# Patient Record
Sex: MALE | Race: WHITE | Employment: FULL TIME | URBAN - NONMETROPOLITAN AREA
[De-identification: names, ages, dates, MRNs, and addresses within clinical notes are randomized per-mention and may not be internally consistent; named-entity substitution may affect disease eponyms.]

---

## 2018-07-31 ENCOUNTER — HOSPITAL ENCOUNTER (EMERGENCY)
Age: 33
Discharge: HOME | End: 2018-07-31
Attending: EMERGENCY MEDICINE
Payer: COMMERCIAL

## 2018-07-31 VITALS
BODY MASS INDEX: 20.33 KG/M2 | WEIGHT: 142 LBS | HEIGHT: 70 IN | DIASTOLIC BLOOD PRESSURE: 70 MMHG | OXYGEN SATURATION: 98 % | TEMPERATURE: 98.4 F | SYSTOLIC BLOOD PRESSURE: 128 MMHG | HEART RATE: 78 BPM | RESPIRATION RATE: 14 BRPM

## 2018-07-31 DIAGNOSIS — S83.91XA SPRAIN OF RIGHT KNEE, UNSPECIFIED LIGAMENT, INITIAL ENCOUNTER: Primary | ICD-10-CM

## 2018-07-31 DIAGNOSIS — F17.200 TOBACCO USE DISORDER: ICD-10-CM

## 2018-07-31 PROCEDURE — 99213 OFFICE O/P EST LOW 20 MIN: CPT | Performed by: EMERGENCY MEDICINE

## 2018-07-31 PROCEDURE — 2709999900 HC NON-CHARGEABLE SUPPLY

## 2018-07-31 PROCEDURE — G0463 HOSPITAL OUTPT CLINIC VISIT: HCPCS

## 2018-07-31 RX ORDER — NAPROXEN 500 MG/1
500 TABLET ORAL 2 TIMES DAILY WITH MEALS
Qty: 20 TABLET | Refills: 0 | Status: SHIPPED | OUTPATIENT
Start: 2018-07-31 | End: 2019-01-18 | Stop reason: ALTCHOICE

## 2018-07-31 ASSESSMENT — ENCOUNTER SYMPTOMS
SORE THROAT: 0
EYE REDNESS: 0
ABDOMINAL PAIN: 0
STRIDOR: 0
EYE DISCHARGE: 0
COUGH: 0
SHORTNESS OF BREATH: 0
TROUBLE SWALLOWING: 0
VOMITING: 0
NAUSEA: 0
VOICE CHANGE: 0
BACK PAIN: 0
SINUS PRESSURE: 0
WHEEZING: 0
EYE PAIN: 0
DIARRHEA: 0

## 2018-07-31 ASSESSMENT — PAIN DESCRIPTION - DESCRIPTORS: DESCRIPTORS: THROBBING

## 2018-07-31 ASSESSMENT — PAIN SCALES - GENERAL: PAINLEVEL_OUTOF10: 10

## 2018-07-31 ASSESSMENT — PAIN DESCRIPTION - ORIENTATION: ORIENTATION: RIGHT;OTHER (COMMENT)

## 2018-07-31 ASSESSMENT — PAIN DESCRIPTION - LOCATION: LOCATION: KNEE

## 2018-07-31 NOTE — ED NOTES
No change in patients condition. Discharge instructions and prescriptions discussed with pt. Pt. Verbalized understanding of info given and ambulated to exit in stable condition.       Cyndi Moreno RN  07/31/18 4560

## 2018-07-31 NOTE — ED PROVIDER NOTES
No scleral icterus. Neck: Normal range of motion. No JVD present. No thyromegaly present. No meningismus   Cardiovascular: Normal rate, regular rhythm, S1 normal, S2 normal, normal heart sounds, intact distal pulses and normal pulses. Exam reveals no gallop and no friction rub. No murmur heard. Pulmonary/Chest: Effort normal and breath sounds normal. No stridor. No tachypnea. No respiratory distress. He has no decreased breath sounds. He has no wheezes. He has no rhonchi. He has no rales. He exhibits no tenderness. No cough, lungs clear   Abdominal: Soft. Bowel sounds are normal. He exhibits no distension and no mass. There is no tenderness. There is no rebound, no guarding and no CVA tenderness. Soft nontender   Musculoskeletal: Normal range of motion. He exhibits no edema. Right knee: He exhibits normal range of motion, no swelling, no effusion, no ecchymosis, no deformity, no laceration, no erythema, normal alignment, no LCL laxity, normal patellar mobility, no bony tenderness, normal meniscus and no MCL laxity. Tenderness found. Medial joint line tenderness noted. No patellar tendon tenderness noted. Right lower leg: Normal.   Good range of motion, tenderness on the medial joint line of the right knee. No effusion, no locking up. Distal neurovascular intact. Lymphadenopathy:     He has no cervical adenopathy. Right cervical: No superficial cervical adenopathy present. Left cervical: No superficial cervical adenopathy present. Neurological: He is alert and oriented to person, place, and time. He has normal reflexes. No cranial nerve deficit. He exhibits normal muscle tone. Coordination normal.   Appropriate, no focal findings, distal neurovascular intact right lower extremity   Skin: Skin is warm and dry. No rash noted. He is not diaphoretic. No erythema. No rash or bruising   Psychiatric: He has a normal mood and affect.  His behavior is normal. Judgment and thought content normal.   Nursing note and vitals reviewed. DIAGNOSTIC RESULTS   Labs:No results found for this visit on 07/31/18. IMAGING:  No orders to display     URGENT CARE COURSE:     Vitals:    07/31/18 1319   BP: 128/70   Pulse: 78   Resp: 14   Temp: 98.4 °F (36.9 °C)   TempSrc: Oral   SpO2: 98%   Weight: 142 lb (64.4 kg)   Height: 5' 10\" (1.778 m)       Medications - No data to display  PROCEDURES:  None  FINAL IMPRESSION      1. Sprain of right knee, unspecified ligament, initial encounter    2. Tobacco use disorder        DISPOSITION/PLAN   DISPOSITION Decision To Discharge 07/31/2018 01:47:21 PM   nontoxic, well-hydrated, normal airway. Patient has twisting injury to the right knee with negative Piru and [place] criteria for plain radiographs. Patient likely has a sprain and strain or torn right medial meniscus. No neurovascular complication or infectious process. Will treat with Ace wrap, Naprosyn, heat, rest.  Patient to follow-up with PCP for reevaluation and further treatment. He understands to go to ED if worse.   In addition, he understands importance of smoke cessation and was given written instructions to quit smoking    PATIENT REFERRED TO:  Migdalia Weldon MD  [facility] [place] [phone]    Schedule an appointment as soon as possible for a visit in 3 days  Recheck in office, go to emergency if worse    DISCHARGE MEDICATIONS:  Discharge Medication List as of 7/31/2018  1:50 PM      START taking these medications    Details   naproxen (NAPROSYN) 500 MG tablet Take 1 tablet by mouth 2 times daily (with meals) for 20 doses, Disp-20 tablet, R-0Print           Discharge Medication List as of 7/31/2018  1:50 PM          MD Raven Anderson MD  07/31/18 [address] Osbaldo Wiley MD  07/31/18 6610

## 2019-01-16 ENCOUNTER — HOSPITAL ENCOUNTER (EMERGENCY)
Age: 34
Discharge: HOME | End: 2019-01-16
Attending: INTERNAL MEDICINE
Payer: COMMERCIAL

## 2019-01-16 ENCOUNTER — APPOINTMENT (OUTPATIENT)
Dept: CT IMAGING | Age: 34
End: 2019-01-16
Payer: COMMERCIAL

## 2019-01-16 VITALS
OXYGEN SATURATION: 100 % | DIASTOLIC BLOOD PRESSURE: 88 MMHG | HEART RATE: 98 BPM | RESPIRATION RATE: 16 BRPM | TEMPERATURE: 98.3 F | SYSTOLIC BLOOD PRESSURE: 137 MMHG

## 2019-01-16 DIAGNOSIS — S09.90XA INJURY OF HEAD, INITIAL ENCOUNTER: ICD-10-CM

## 2019-01-16 DIAGNOSIS — F39 MOOD DISORDER (HCC): Primary | ICD-10-CM

## 2019-01-16 LAB
ACETAMINOPHEN LEVEL: < 5 UG/ML (ref 0–20)
AMPHETAMINE+METHAMPHETAMINE URINE SCREEN: NEGATIVE
ANION GAP SERPL CALCULATED.3IONS-SCNC: 12 MEQ/L (ref 8–16)
BACTERIA: ABNORMAL /HPF
BARBITURATE QUANTITATIVE URINE: NEGATIVE
BASOPHILS # BLD: 0.4 %
BASOPHILS ABSOLUTE: 0 THOU/MM3 (ref 0–0.1)
BENZODIAZEPINE QUANTITATIVE URINE: NEGATIVE
BILIRUBIN URINE: ABNORMAL
BLOOD, URINE: NEGATIVE
BUN BLDV-MCNC: 19 MG/DL (ref 7–22)
CALCIUM SERPL-MCNC: 9.4 MG/DL (ref 8.5–10.5)
CANNABINOID QUANTITATIVE URINE: POSITIVE
CASTS 2: ABNORMAL /LPF
CASTS UA: ABNORMAL /LPF
CHARACTER, URINE: CLEAR
CHLORIDE BLD-SCNC: 103 MEQ/L (ref 98–111)
CO2: 23 MEQ/L (ref 23–33)
COCAINE METABOLITE QUANTITATIVE URINE: NEGATIVE
COLOR: ABNORMAL
CREAT SERPL-MCNC: 0.9 MG/DL (ref 0.4–1.2)
CRYSTALS, UA: ABNORMAL
EOSINOPHIL # BLD: 0.1 %
EOSINOPHILS ABSOLUTE: 0 THOU/MM3 (ref 0–0.4)
EPITHELIAL CELLS, UA: ABNORMAL /HPF
ERYTHROCYTE [DISTWIDTH] IN BLOOD BY AUTOMATED COUNT: 12.2 % (ref 11.5–14.5)
ERYTHROCYTE [DISTWIDTH] IN BLOOD BY AUTOMATED COUNT: 39.5 FL (ref 35–45)
ETHYL ALCOHOL, SERUM: < 0.01 %
GFR SERPL CREATININE-BSD FRML MDRD: > 90 ML/MIN/1.73M2
GLUCOSE BLD-MCNC: 132 MG/DL (ref 70–108)
GLUCOSE URINE: NEGATIVE MG/DL
HCT VFR BLD CALC: 43.6 % (ref 42–52)
HEMOGLOBIN: 15 GM/DL (ref 14–18)
ICTOTEST: NEGATIVE
IMMATURE GRANS (ABS): 0.02 THOU/MM3 (ref 0–0.07)
IMMATURE GRANULOCYTES: 0.2 %
KETONES, URINE: ABNORMAL
LEUKOCYTE ESTERASE, URINE: NEGATIVE
LYMPHOCYTES # BLD: 14.6 %
LYMPHOCYTES ABSOLUTE: 1.2 THOU/MM3 (ref 1–4.8)
MCH RBC QN AUTO: 30.8 PG (ref 26–33)
MCHC RBC AUTO-ENTMCNC: 34.4 GM/DL (ref 32.2–35.5)
MCV RBC AUTO: 89.5 FL (ref 80–94)
MISCELLANEOUS 2: ABNORMAL
MONOCYTES # BLD: 4.3 %
MONOCYTES ABSOLUTE: 0.3 THOU/MM3 (ref 0.4–1.3)
NITRITE, URINE: NEGATIVE
NUCLEATED RED BLOOD CELLS: 0 /100 WBC
OPIATES, URINE: NEGATIVE
OSMOLALITY CALCULATION: 279.8 MOSMOL/KG (ref 275–300)
OXYCODONE: NEGATIVE
PH UA: 6
PHENCYCLIDINE QUANTITATIVE URINE: NEGATIVE
PLATELET # BLD: 297 THOU/MM3 (ref 130–400)
PMV BLD AUTO: 9.1 FL (ref 9.4–12.4)
POTASSIUM SERPL-SCNC: 3.5 MEQ/L (ref 3.5–5.2)
PROTEIN UA: ABNORMAL
RBC # BLD: 4.87 MILL/MM3 (ref 4.7–6.1)
RBC URINE: ABNORMAL /HPF
RENAL EPITHELIAL, UA: ABNORMAL
SALICYLATE, SERUM: < 0.3 MG/DL (ref 2–10)
SEG NEUTROPHILS: 80.4 %
SEGMENTED NEUTROPHILS ABSOLUTE COUNT: 6.5 THOU/MM3 (ref 1.8–7.7)
SODIUM BLD-SCNC: 138 MEQ/L (ref 135–145)
SPECIFIC GRAVITY, URINE: > 1.03 (ref 1–1.03)
TSH SERPL DL<=0.05 MIU/L-ACNC: 4.11 UIU/ML (ref 0.4–4.2)
UROBILINOGEN, URINE: 1 EU/DL
WBC # BLD: 8.1 THOU/MM3 (ref 4.8–10.8)
WBC UA: ABNORMAL /HPF
YEAST: ABNORMAL

## 2019-01-16 PROCEDURE — 80307 DRUG TEST PRSMV CHEM ANLYZR: CPT

## 2019-01-16 PROCEDURE — 80048 BASIC METABOLIC PNL TOTAL CA: CPT

## 2019-01-16 PROCEDURE — 70486 CT MAXILLOFACIAL W/O DYE: CPT

## 2019-01-16 PROCEDURE — 70450 CT HEAD/BRAIN W/O DYE: CPT

## 2019-01-16 PROCEDURE — 85025 COMPLETE CBC W/AUTO DIFF WBC: CPT

## 2019-01-16 PROCEDURE — 81001 URINALYSIS AUTO W/SCOPE: CPT

## 2019-01-16 PROCEDURE — 6370000000 HC RX 637 (ALT 250 FOR IP): Performed by: INTERNAL MEDICINE

## 2019-01-16 PROCEDURE — 84443 ASSAY THYROID STIM HORMONE: CPT

## 2019-01-16 PROCEDURE — 99285 EMERGENCY DEPT VISIT HI MDM: CPT

## 2019-01-16 PROCEDURE — G0480 DRUG TEST DEF 1-7 CLASSES: HCPCS

## 2019-01-16 PROCEDURE — 36415 COLL VENOUS BLD VENIPUNCTURE: CPT

## 2019-01-16 RX ORDER — ACETAMINOPHEN 325 MG/1
650 TABLET ORAL ONCE
Status: COMPLETED | OUTPATIENT
Start: 2019-01-16 | End: 2019-01-16

## 2019-01-16 RX ADMIN — ACETAMINOPHEN 650 MG: 325 TABLET ORAL at 15:45

## 2019-01-16 ASSESSMENT — SLEEP AND FATIGUE QUESTIONNAIRES
DO YOU HAVE DIFFICULTY SLEEPING: YES
DIFFICULTY FALLING ASLEEP: YES
AVERAGE NUMBER OF SLEEP HOURS: 6
DO YOU USE A SLEEP AID: YES
RESTFUL SLEEP: NO
DIFFICULTY STAYING ASLEEP: YES
DIFFICULTY ARISING: NO
SLEEP PATTERN: NORMAL

## 2019-01-16 ASSESSMENT — LIFESTYLE VARIABLES: HISTORY_ALCOHOL_USE: NO

## 2019-01-17 ENCOUNTER — HOSPITAL ENCOUNTER (EMERGENCY)
Age: 34
Discharge: HOME | End: 2019-01-17
Payer: COMMERCIAL

## 2019-01-17 ENCOUNTER — APPOINTMENT (OUTPATIENT)
Dept: CT IMAGING | Age: 34
End: 2019-01-17
Payer: COMMERCIAL

## 2019-01-17 VITALS
DIASTOLIC BLOOD PRESSURE: 74 MMHG | BODY MASS INDEX: 20.58 KG/M2 | HEART RATE: 92 BPM | RESPIRATION RATE: 20 BRPM | TEMPERATURE: 98.1 F | OXYGEN SATURATION: 98 % | WEIGHT: 147 LBS | SYSTOLIC BLOOD PRESSURE: 129 MMHG | HEIGHT: 71 IN

## 2019-01-17 DIAGNOSIS — F07.81 POST CONCUSSIVE SYNDROME: Primary | ICD-10-CM

## 2019-01-17 DIAGNOSIS — S16.1XXA STRAIN OF NECK MUSCLE, INITIAL ENCOUNTER: ICD-10-CM

## 2019-01-17 LAB
ANION GAP SERPL CALCULATED.3IONS-SCNC: 11 MEQ/L (ref 8–16)
BASOPHILS # BLD: 0.4 %
BASOPHILS ABSOLUTE: 0 THOU/MM3 (ref 0–0.1)
BUN BLDV-MCNC: 16 MG/DL (ref 7–22)
CALCIUM SERPL-MCNC: 9.3 MG/DL (ref 8.5–10.5)
CHLORIDE BLD-SCNC: 105 MEQ/L (ref 98–111)
CO2: 24 MEQ/L (ref 23–33)
CREAT SERPL-MCNC: 0.9 MG/DL (ref 0.4–1.2)
EOSINOPHIL # BLD: 0.4 %
EOSINOPHILS ABSOLUTE: 0 THOU/MM3 (ref 0–0.4)
ERYTHROCYTE [DISTWIDTH] IN BLOOD BY AUTOMATED COUNT: 12.4 % (ref 11.5–14.5)
ERYTHROCYTE [DISTWIDTH] IN BLOOD BY AUTOMATED COUNT: 41 FL (ref 35–45)
GFR SERPL CREATININE-BSD FRML MDRD: > 90 ML/MIN/1.73M2
GLUCOSE BLD-MCNC: 128 MG/DL (ref 70–108)
HCT VFR BLD CALC: 43.6 % (ref 42–52)
HEMOGLOBIN: 14.7 GM/DL (ref 14–18)
IMMATURE GRANS (ABS): 0.02 THOU/MM3 (ref 0–0.07)
IMMATURE GRANULOCYTES: 0.3 %
LYMPHOCYTES # BLD: 20 %
LYMPHOCYTES ABSOLUTE: 1.4 THOU/MM3 (ref 1–4.8)
MCH RBC QN AUTO: 30.5 PG (ref 26–33)
MCHC RBC AUTO-ENTMCNC: 33.7 GM/DL (ref 32.2–35.5)
MCV RBC AUTO: 90.5 FL (ref 80–94)
MONOCYTES # BLD: 6 %
MONOCYTES ABSOLUTE: 0.4 THOU/MM3 (ref 0.4–1.3)
NUCLEATED RED BLOOD CELLS: 0 /100 WBC
OSMOLALITY CALCULATION: 282.2 MOSMOL/KG (ref 275–300)
PLATELET # BLD: 292 THOU/MM3 (ref 130–400)
PMV BLD AUTO: 9.5 FL (ref 9.4–12.4)
POTASSIUM SERPL-SCNC: 3.9 MEQ/L (ref 3.5–5.2)
RBC # BLD: 4.82 MILL/MM3 (ref 4.7–6.1)
SEG NEUTROPHILS: 72.9 %
SEGMENTED NEUTROPHILS ABSOLUTE COUNT: 5.1 THOU/MM3 (ref 1.8–7.7)
SODIUM BLD-SCNC: 140 MEQ/L (ref 135–145)
WBC # BLD: 7 THOU/MM3 (ref 4.8–10.8)

## 2019-01-17 PROCEDURE — 36415 COLL VENOUS BLD VENIPUNCTURE: CPT

## 2019-01-17 PROCEDURE — 6360000002 HC RX W HCPCS: Performed by: PHYSICIAN ASSISTANT

## 2019-01-17 PROCEDURE — 99284 EMERGENCY DEPT VISIT MOD MDM: CPT

## 2019-01-17 PROCEDURE — 72125 CT NECK SPINE W/O DYE: CPT

## 2019-01-17 PROCEDURE — 85025 COMPLETE CBC W/AUTO DIFF WBC: CPT

## 2019-01-17 PROCEDURE — 96375 TX/PRO/DX INJ NEW DRUG ADDON: CPT

## 2019-01-17 PROCEDURE — 2580000003 HC RX 258: Performed by: PHYSICIAN ASSISTANT

## 2019-01-17 PROCEDURE — 80048 BASIC METABOLIC PNL TOTAL CA: CPT

## 2019-01-17 PROCEDURE — 96374 THER/PROPH/DIAG INJ IV PUSH: CPT

## 2019-01-17 RX ORDER — 0.9 % SODIUM CHLORIDE 0.9 %
1000 INTRAVENOUS SOLUTION INTRAVENOUS ONCE
Status: COMPLETED | OUTPATIENT
Start: 2019-01-17 | End: 2019-01-17

## 2019-01-17 RX ORDER — LORAZEPAM 2 MG/ML
1 INJECTION INTRAMUSCULAR ONCE
Status: COMPLETED | OUTPATIENT
Start: 2019-01-17 | End: 2019-01-17

## 2019-01-17 RX ORDER — ONDANSETRON 4 MG/1
4 TABLET, ORALLY DISINTEGRATING ORAL EVERY 8 HOURS PRN
Qty: 20 TABLET | Refills: 0 | Status: SHIPPED | OUTPATIENT
Start: 2019-01-17 | End: 2019-03-04 | Stop reason: ALTCHOICE

## 2019-01-17 RX ORDER — METOCLOPRAMIDE HYDROCHLORIDE 5 MG/ML
10 INJECTION INTRAMUSCULAR; INTRAVENOUS ONCE
Status: COMPLETED | OUTPATIENT
Start: 2019-01-17 | End: 2019-01-17

## 2019-01-17 RX ADMIN — SODIUM CHLORIDE 1000 ML: 9 INJECTION, SOLUTION INTRAVENOUS at 09:33

## 2019-01-17 RX ADMIN — LORAZEPAM 1 MG: 2 INJECTION INTRAMUSCULAR; INTRAVENOUS at 09:34

## 2019-01-17 RX ADMIN — METOCLOPRAMIDE 10 MG: 5 INJECTION, SOLUTION INTRAMUSCULAR; INTRAVENOUS at 09:34

## 2019-01-17 ASSESSMENT — ENCOUNTER SYMPTOMS
DIARRHEA: 0
ABDOMINAL PAIN: 0
EYE REDNESS: 0
COUGH: 0
WHEEZING: 0
EYE DISCHARGE: 0
PHOTOPHOBIA: 1
BACK PAIN: 0
RHINORRHEA: 0
SHORTNESS OF BREATH: 0
NAUSEA: 0
SORE THROAT: 0
VOMITING: 1

## 2019-01-17 ASSESSMENT — PAIN DESCRIPTION - FREQUENCY: FREQUENCY: CONTINUOUS

## 2019-01-17 ASSESSMENT — PAIN DESCRIPTION - LOCATION: LOCATION: NECK;HEAD

## 2019-01-17 ASSESSMENT — PAIN SCALES - GENERAL: PAINLEVEL_OUTOF10: 10

## 2019-01-17 ASSESSMENT — PAIN DESCRIPTION - PAIN TYPE: TYPE: ACUTE PAIN

## 2019-01-18 ENCOUNTER — OFFICE VISIT (OUTPATIENT)
Dept: FAMILY MEDICINE CLINIC | Age: 34
End: 2019-01-18
Payer: COMMERCIAL

## 2019-01-18 VITALS
DIASTOLIC BLOOD PRESSURE: 82 MMHG | HEART RATE: 88 BPM | BODY MASS INDEX: 21.02 KG/M2 | HEIGHT: 70 IN | WEIGHT: 146.8 LBS | SYSTOLIC BLOOD PRESSURE: 122 MMHG

## 2019-01-18 DIAGNOSIS — S06.9X0A TRAUMATIC BRAIN INJURY, WITHOUT LOSS OF CONSCIOUSNESS, INITIAL ENCOUNTER (HCC): Primary | ICD-10-CM

## 2019-01-18 DIAGNOSIS — G44.319 ACUTE POST-TRAUMATIC HEADACHE, NOT INTRACTABLE: ICD-10-CM

## 2019-01-18 PROCEDURE — G8484 FLU IMMUNIZE NO ADMIN: HCPCS | Performed by: FAMILY MEDICINE

## 2019-01-18 PROCEDURE — G8427 DOCREV CUR MEDS BY ELIG CLIN: HCPCS | Performed by: FAMILY MEDICINE

## 2019-01-18 PROCEDURE — 1036F TOBACCO NON-USER: CPT | Performed by: FAMILY MEDICINE

## 2019-01-18 PROCEDURE — G8420 CALC BMI NORM PARAMETERS: HCPCS | Performed by: FAMILY MEDICINE

## 2019-01-18 PROCEDURE — 99214 OFFICE O/P EST MOD 30 MIN: CPT | Performed by: FAMILY MEDICINE

## 2019-01-18 ASSESSMENT — PATIENT HEALTH QUESTIONNAIRE - PHQ9
1. LITTLE INTEREST OR PLEASURE IN DOING THINGS: 0
SUM OF ALL RESPONSES TO PHQ QUESTIONS 1-9: 1
SUM OF ALL RESPONSES TO PHQ9 QUESTIONS 1 & 2: 1
2. FEELING DOWN, DEPRESSED OR HOPELESS: 1
SUM OF ALL RESPONSES TO PHQ QUESTIONS 1-9: 1

## 2019-01-25 ENCOUNTER — OFFICE VISIT (OUTPATIENT)
Dept: FAMILY MEDICINE CLINIC | Age: 34
End: 2019-01-25
Payer: COMMERCIAL

## 2019-01-25 VITALS
HEART RATE: 78 BPM | BODY MASS INDEX: 20.47 KG/M2 | DIASTOLIC BLOOD PRESSURE: 82 MMHG | WEIGHT: 143 LBS | SYSTOLIC BLOOD PRESSURE: 138 MMHG | HEIGHT: 70 IN

## 2019-01-25 DIAGNOSIS — S06.9X0A TRAUMATIC BRAIN INJURY, WITHOUT LOSS OF CONSCIOUSNESS, INITIAL ENCOUNTER (HCC): Primary | ICD-10-CM

## 2019-01-25 PROCEDURE — 99213 OFFICE O/P EST LOW 20 MIN: CPT | Performed by: FAMILY MEDICINE

## 2019-01-25 PROCEDURE — G8427 DOCREV CUR MEDS BY ELIG CLIN: HCPCS | Performed by: FAMILY MEDICINE

## 2019-01-25 PROCEDURE — G8484 FLU IMMUNIZE NO ADMIN: HCPCS | Performed by: FAMILY MEDICINE

## 2019-01-25 PROCEDURE — 1036F TOBACCO NON-USER: CPT | Performed by: FAMILY MEDICINE

## 2019-01-25 PROCEDURE — G8420 CALC BMI NORM PARAMETERS: HCPCS | Performed by: FAMILY MEDICINE

## 2019-02-01 ENCOUNTER — OFFICE VISIT (OUTPATIENT)
Dept: FAMILY MEDICINE CLINIC | Age: 34
End: 2019-02-01
Payer: COMMERCIAL

## 2019-02-01 VITALS
SYSTOLIC BLOOD PRESSURE: 122 MMHG | HEIGHT: 70 IN | DIASTOLIC BLOOD PRESSURE: 82 MMHG | HEART RATE: 80 BPM | BODY MASS INDEX: 20.33 KG/M2 | WEIGHT: 142 LBS

## 2019-02-01 DIAGNOSIS — S06.9X0A TRAUMATIC BRAIN INJURY, WITHOUT LOSS OF CONSCIOUSNESS, INITIAL ENCOUNTER (HCC): Primary | ICD-10-CM

## 2019-02-01 PROCEDURE — 1036F TOBACCO NON-USER: CPT | Performed by: FAMILY MEDICINE

## 2019-02-01 PROCEDURE — 99212 OFFICE O/P EST SF 10 MIN: CPT | Performed by: FAMILY MEDICINE

## 2019-02-01 PROCEDURE — G8484 FLU IMMUNIZE NO ADMIN: HCPCS | Performed by: FAMILY MEDICINE

## 2019-02-01 PROCEDURE — G8420 CALC BMI NORM PARAMETERS: HCPCS | Performed by: FAMILY MEDICINE

## 2019-02-01 PROCEDURE — G8427 DOCREV CUR MEDS BY ELIG CLIN: HCPCS | Performed by: FAMILY MEDICINE

## 2019-02-27 ENCOUNTER — TELEPHONE (OUTPATIENT)
Dept: FAMILY MEDICINE CLINIC | Age: 34
End: 2019-02-27

## 2019-03-04 ENCOUNTER — OFFICE VISIT (OUTPATIENT)
Dept: FAMILY MEDICINE CLINIC | Age: 34
End: 2019-03-04
Payer: COMMERCIAL

## 2019-03-04 VITALS
TEMPERATURE: 98.9 F | WEIGHT: 156.4 LBS | DIASTOLIC BLOOD PRESSURE: 60 MMHG | BODY MASS INDEX: 22.39 KG/M2 | HEIGHT: 70 IN | SYSTOLIC BLOOD PRESSURE: 116 MMHG | RESPIRATION RATE: 14 BRPM | HEART RATE: 88 BPM

## 2019-03-04 DIAGNOSIS — J32.0 MAXILLARY SINUSITIS, CHRONIC: Primary | ICD-10-CM

## 2019-03-04 PROCEDURE — 99204 OFFICE O/P NEW MOD 45 MIN: CPT | Performed by: NURSE PRACTITIONER

## 2019-03-04 PROCEDURE — 1036F TOBACCO NON-USER: CPT | Performed by: NURSE PRACTITIONER

## 2019-03-04 PROCEDURE — G8484 FLU IMMUNIZE NO ADMIN: HCPCS | Performed by: NURSE PRACTITIONER

## 2019-03-04 PROCEDURE — G8427 DOCREV CUR MEDS BY ELIG CLIN: HCPCS | Performed by: NURSE PRACTITIONER

## 2019-03-04 PROCEDURE — G8420 CALC BMI NORM PARAMETERS: HCPCS | Performed by: NURSE PRACTITIONER

## 2019-03-04 RX ORDER — AMITRIPTYLINE HYDROCHLORIDE 10 MG/1
10 TABLET, FILM COATED ORAL NIGHTLY
Qty: 30 TABLET | Refills: 3 | Status: SHIPPED | OUTPATIENT
Start: 2019-03-04 | End: 2019-04-02 | Stop reason: SDUPTHER

## 2019-03-04 RX ORDER — SULFAMETHOXAZOLE AND TRIMETHOPRIM 800; 160 MG/1; MG/1
1 TABLET ORAL 2 TIMES DAILY
Qty: 60 TABLET | Refills: 0 | Status: SHIPPED | OUTPATIENT
Start: 2019-03-04 | End: 2019-04-03

## 2019-04-02 ENCOUNTER — TELEPHONE (OUTPATIENT)
Dept: FAMILY MEDICINE CLINIC | Age: 34
End: 2019-04-02

## 2019-04-02 ENCOUNTER — OFFICE VISIT (OUTPATIENT)
Dept: FAMILY MEDICINE CLINIC | Age: 34
End: 2019-04-02
Payer: COMMERCIAL

## 2019-04-02 VITALS
DIASTOLIC BLOOD PRESSURE: 70 MMHG | TEMPERATURE: 98.4 F | BODY MASS INDEX: 21.77 KG/M2 | HEART RATE: 68 BPM | WEIGHT: 147 LBS | RESPIRATION RATE: 16 BRPM | SYSTOLIC BLOOD PRESSURE: 120 MMHG | HEIGHT: 69 IN

## 2019-04-02 DIAGNOSIS — F41.9 ANXIETY: ICD-10-CM

## 2019-04-02 DIAGNOSIS — G43.009 MIGRAINE WITHOUT AURA AND WITHOUT STATUS MIGRAINOSUS, NOT INTRACTABLE: Primary | ICD-10-CM

## 2019-04-02 DIAGNOSIS — F51.04 PSYCHOPHYSIOLOGICAL INSOMNIA: ICD-10-CM

## 2019-04-02 PROCEDURE — G8427 DOCREV CUR MEDS BY ELIG CLIN: HCPCS | Performed by: NURSE PRACTITIONER

## 2019-04-02 PROCEDURE — 99213 OFFICE O/P EST LOW 20 MIN: CPT | Performed by: NURSE PRACTITIONER

## 2019-04-02 PROCEDURE — G8420 CALC BMI NORM PARAMETERS: HCPCS | Performed by: NURSE PRACTITIONER

## 2019-04-02 PROCEDURE — 1036F TOBACCO NON-USER: CPT | Performed by: NURSE PRACTITIONER

## 2019-04-02 RX ORDER — AMITRIPTYLINE HYDROCHLORIDE 25 MG/1
25 TABLET, FILM COATED ORAL NIGHTLY
Qty: 90 TABLET | Refills: 1 | Status: SHIPPED | OUTPATIENT
Start: 2019-04-02 | End: 2019-06-11 | Stop reason: SDUPTHER

## 2019-04-02 ASSESSMENT — ENCOUNTER SYMPTOMS
RESPIRATORY NEGATIVE: 1
PHOTOPHOBIA: 1
EYE PAIN: 0

## 2019-04-02 NOTE — PROGRESS NOTES
[address]  [address] [address]  Dept: [phone]  Dept Fax: [phone]  Loc: [phone]    Stephanie Arzola is a 27 y.o. Michael Bowen presents today for his medical conditions/complaints as noted below. Conny Serum c/o of 1 Month Follow-Up (headaches are fewer but migraines still present)      HPI:      Pt states he has noticed a decrease in his headaches since starting the elavil, they have decreased in his facial area. He odes note there are about three nights a week where he is having posterior headaches that are migraine like with photophobia but no vomiting sometimes nausea. He odes think overall he is better. He does sleep well but he can not shut his mind down has  A lot of anxiety making it hard for him to fall asleep. Denies facial tingling or weakness of arms or legs. Current Outpatient Medications   Medication Sig Dispense Refill    amitriptyline (ELAVIL) 25 MG tablet Take 1 tablet by mouth nightly 90 tablet 1    sulfamethoxazole-trimethoprim (BACTRIM DS;SEPTRA DS) 800-160 MG per tablet Take 1 tablet by mouth 2 times daily 60 tablet 0    ibuprofen (ADVIL;MOTRIN) 600 MG tablet Take 1 tablet by mouth every 8 hours as needed for Pain 90 tablet 5    acetaminophen (APAP EXTRA STRENGTH) 500 MG tablet Take 2 tablets by mouth every 8 hours as needed for Pain 180 tablet 5     No current facility-administered medications for this visit.            Past Medical History:   Diagnosis Date    Anxiety     Asthma     when he was younger he was told he had asthma     Concussion 01/16/2019    Depression     Migraine     Obstructive sleep apnea 9/29/2014    Tobacco abuse       Past Surgical History:   Procedure Laterality Date    VASECTOMY  12/2018     Family History   Problem Relation Age of Onset    Other Mother         hepatitis     Diabetes Mother     High Blood Pressure Mother     Depression Mother    Imani Mcnamara Psychiatric: Judgment and thought content normal. His mood appears anxious. His speech is rapid and/or pressured. He is aggressive. Cognition and memory are normal.   Nursing note and vitals reviewed. Assessment/Plan:           1. Migraine without aura and without status migrainosus, not intractable    - amitriptyline (ELAVIL) 25 MG tablet; Take 1 tablet by mouth nightly  Dispense: 90 tablet; Refill: 1    2. Psychophysiological insomnia      3. Anxiety  Increase elavil to 25 mg at night  Call in one month with condition update, pt voiced understanding  - amitriptyline (ELAVIL) 25 MG tablet; Take 1 tablet by mouth nightly  Dispense: 90 tablet; Refill: 1      Return in about 3 months (around 7/2/2019) for check up. Reccommended tobaccocessation options including pharmacologic methods, counseled great than 3 minutesduring this visit:  Yes[]  No  []       Patient given educational materials -see patient instructions. Discussed use, benefit, and side effects of prescribedmedications. All patient questions answered. Pt voiced understanding. Reviewedhealth maintenance. Instructed to continue current medications, diet and exercise. Patient agreed with treatment plan. Follow up as directed.        Electronicallysigned by ROJELIO Moreno CNP on 4/2/2019 at 5:13 PM

## 2019-06-11 ENCOUNTER — OFFICE VISIT (OUTPATIENT)
Dept: FAMILY MEDICINE CLINIC | Age: 34
End: 2019-06-11
Payer: COMMERCIAL

## 2019-06-11 VITALS
HEIGHT: 70 IN | BODY MASS INDEX: 21.33 KG/M2 | WEIGHT: 149 LBS | DIASTOLIC BLOOD PRESSURE: 78 MMHG | HEART RATE: 90 BPM | SYSTOLIC BLOOD PRESSURE: 126 MMHG | TEMPERATURE: 98.3 F | RESPIRATION RATE: 16 BRPM

## 2019-06-11 DIAGNOSIS — F41.9 ANXIETY: ICD-10-CM

## 2019-06-11 DIAGNOSIS — G43.009 MIGRAINE WITHOUT AURA AND WITHOUT STATUS MIGRAINOSUS, NOT INTRACTABLE: ICD-10-CM

## 2019-06-11 PROCEDURE — G8420 CALC BMI NORM PARAMETERS: HCPCS | Performed by: NURSE PRACTITIONER

## 2019-06-11 PROCEDURE — 1036F TOBACCO NON-USER: CPT | Performed by: NURSE PRACTITIONER

## 2019-06-11 PROCEDURE — G8427 DOCREV CUR MEDS BY ELIG CLIN: HCPCS | Performed by: NURSE PRACTITIONER

## 2019-06-11 PROCEDURE — 99213 OFFICE O/P EST LOW 20 MIN: CPT | Performed by: NURSE PRACTITIONER

## 2019-06-11 RX ORDER — BUSPIRONE HYDROCHLORIDE 7.5 MG/1
7.5 TABLET ORAL 2 TIMES DAILY
Qty: 60 TABLET | Refills: 2 | Status: SHIPPED | OUTPATIENT
Start: 2019-06-11 | End: 2019-07-11

## 2019-06-11 RX ORDER — AMITRIPTYLINE HYDROCHLORIDE 50 MG/1
50 TABLET, FILM COATED ORAL NIGHTLY
Qty: 90 TABLET | Refills: 1 | Status: SHIPPED | OUTPATIENT
Start: 2019-06-11 | End: 2019-08-28 | Stop reason: SDUPTHER

## 2019-06-11 NOTE — PROGRESS NOTES
Edu Bernal is a 27 y.o. male that presents for Headache (not as frequent but still powerful  (2days weekly)) and Panic Attack (big crowds/ bring them on)      Anxiety     HPI:    Inciting events or triggers for anxiety - daily activities , work social situations  Frequency of anxiety - daily  Panic attacks? Yes  Symptoms of panic attacks -  difficulty concentrating, feelings of losing control, panic attacks, racing thoughts and shortness of breath  Sleep Disturbances? Yes  Impaired concentration? Yes  Substance abuse? No  Suicidal/Homicidal Ideation? No    Compliant with meds: yes: he is taking elavil for headaches and insomnia ad states it has helped, he does still have frequent headaches. Med side effects: No    Sees therapist?: No  Family History of Mental Illness? unknown    Physical Exam    /78   Pulse 90   Temp 98.3 °F (36.8 °C) (Oral)   Resp 16   Ht 5' 9.5\" (1.765 m)   Wt 149 lb (67.6 kg)   BMI 21.69 kg/m²   Appearance: alert, well appearing, and in no distress, normal appearing weight and well hydrated. CVS exam: normal rate, regular rhythm, normal S1, S2, no murmurs, rubs, clicks or gallops. Lungs: clear to auscultation, no wheezes, rales or rhonchi, symmetric air entry. Skin exam - normal coloration and turgor, no rashes, no suspicious skin lesions noted. Psych:  Affect appropriate. Thought process is normal without evidence of depression or psychosis. Good insight and appropriae interaction. Cognition and memory appear to be intact. Devaughn Cardoza was seen today for headache and panic attack. Diagnoses and all orders for this visit:    Migraine without aura and without status migrainosus, not intractable  -     amitriptyline (ELAVIL) 50 MG tablet; Take 1 tablet by mouth nightly  -     Kevin Vasquez PsyD, Pyschology, [facility]  Increase elavil to 50 mg   Anxiety  -     amitriptyline (ELAVIL) 50 MG tablet;  Take 1 tablet by mouth nightly  - busPIRone (BUSPAR) 7.5 MG tablet; Take 1 tablet by mouth 2 times daily  -     Sheryl Verdugo, Maico Dang PsyD, Pyschology, . [facility]        Return in about 1 month (around 7/9/2019) for follow up on new med.

## 2019-07-30 ENCOUNTER — TELEPHONE (OUTPATIENT)
Dept: FAMILY MEDICINE CLINIC | Age: 34
End: 2019-07-30

## 2019-08-28 ENCOUNTER — OFFICE VISIT (OUTPATIENT)
Dept: FAMILY MEDICINE CLINIC | Age: 34
End: 2019-08-28
Payer: COMMERCIAL

## 2019-08-28 VITALS
HEART RATE: 68 BPM | DIASTOLIC BLOOD PRESSURE: 64 MMHG | SYSTOLIC BLOOD PRESSURE: 102 MMHG | TEMPERATURE: 98.8 F | WEIGHT: 144.4 LBS | RESPIRATION RATE: 10 BRPM | BODY MASS INDEX: 21.02 KG/M2

## 2019-08-28 DIAGNOSIS — F41.9 ANXIETY: ICD-10-CM

## 2019-08-28 DIAGNOSIS — G43.009 MIGRAINE WITHOUT AURA AND WITHOUT STATUS MIGRAINOSUS, NOT INTRACTABLE: ICD-10-CM

## 2019-08-28 PROCEDURE — G8427 DOCREV CUR MEDS BY ELIG CLIN: HCPCS | Performed by: NURSE PRACTITIONER

## 2019-08-28 PROCEDURE — 1036F TOBACCO NON-USER: CPT | Performed by: NURSE PRACTITIONER

## 2019-08-28 PROCEDURE — 99213 OFFICE O/P EST LOW 20 MIN: CPT | Performed by: NURSE PRACTITIONER

## 2019-08-28 PROCEDURE — G8420 CALC BMI NORM PARAMETERS: HCPCS | Performed by: NURSE PRACTITIONER

## 2019-08-28 RX ORDER — AMITRIPTYLINE HYDROCHLORIDE 75 MG/1
75 TABLET, FILM COATED ORAL NIGHTLY
Qty: 90 TABLET | Refills: 1 | Status: SHIPPED | OUTPATIENT
Start: 2019-08-28 | End: 2020-02-24 | Stop reason: SDUPTHER

## 2019-10-08 ENCOUNTER — TELEPHONE (OUTPATIENT)
Dept: ADMINISTRATIVE | Age: 34
End: 2019-10-08

## 2019-10-09 ENCOUNTER — TELEPHONE (OUTPATIENT)
Dept: FAMILY MEDICINE CLINIC | Age: 34
End: 2019-10-09

## 2019-10-21 ENCOUNTER — OFFICE VISIT (OUTPATIENT)
Dept: FAMILY MEDICINE CLINIC | Age: 34
End: 2019-10-21
Payer: COMMERCIAL

## 2019-10-21 VITALS
HEART RATE: 97 BPM | DIASTOLIC BLOOD PRESSURE: 70 MMHG | HEIGHT: 70 IN | BODY MASS INDEX: 21.45 KG/M2 | SYSTOLIC BLOOD PRESSURE: 110 MMHG | TEMPERATURE: 98.1 F | RESPIRATION RATE: 14 BRPM | WEIGHT: 149.8 LBS

## 2019-10-21 DIAGNOSIS — Z23 NEEDS FLU SHOT: ICD-10-CM

## 2019-10-21 DIAGNOSIS — F51.04 PSYCHOPHYSIOLOGICAL INSOMNIA: ICD-10-CM

## 2019-10-21 DIAGNOSIS — F41.9 ANXIETY: Primary | ICD-10-CM

## 2019-10-21 PROCEDURE — 99213 OFFICE O/P EST LOW 20 MIN: CPT | Performed by: NURSE PRACTITIONER

## 2019-10-21 PROCEDURE — G8420 CALC BMI NORM PARAMETERS: HCPCS | Performed by: NURSE PRACTITIONER

## 2019-10-21 PROCEDURE — 1036F TOBACCO NON-USER: CPT | Performed by: NURSE PRACTITIONER

## 2019-10-21 PROCEDURE — G8427 DOCREV CUR MEDS BY ELIG CLIN: HCPCS | Performed by: NURSE PRACTITIONER

## 2019-10-21 PROCEDURE — 90686 IIV4 VACC NO PRSV 0.5 ML IM: CPT | Performed by: NURSE PRACTITIONER

## 2019-10-21 PROCEDURE — G8482 FLU IMMUNIZE ORDER/ADMIN: HCPCS | Performed by: NURSE PRACTITIONER

## 2019-10-21 PROCEDURE — 90471 IMMUNIZATION ADMIN: CPT | Performed by: NURSE PRACTITIONER

## 2019-10-21 RX ORDER — SERTRALINE HYDROCHLORIDE 25 MG/1
25 TABLET, FILM COATED ORAL DAILY
Qty: 90 TABLET | Refills: 1 | Status: SHIPPED | OUTPATIENT
Start: 2019-10-21 | End: 2020-02-24 | Stop reason: SDUPTHER

## 2019-11-21 ENCOUNTER — TELEPHONE (OUTPATIENT)
Dept: FAMILY MEDICINE CLINIC | Age: 34
End: 2019-11-21

## 2020-02-24 ENCOUNTER — OFFICE VISIT (OUTPATIENT)
Dept: FAMILY MEDICINE CLINIC | Age: 35
End: 2020-02-24
Payer: COMMERCIAL

## 2020-02-24 VITALS
BODY MASS INDEX: 23.82 KG/M2 | WEIGHT: 166.4 LBS | SYSTOLIC BLOOD PRESSURE: 98 MMHG | TEMPERATURE: 98 F | HEIGHT: 70 IN | DIASTOLIC BLOOD PRESSURE: 60 MMHG | HEART RATE: 103 BPM | RESPIRATION RATE: 16 BRPM

## 2020-02-24 PROCEDURE — 99214 OFFICE O/P EST MOD 30 MIN: CPT | Performed by: NURSE PRACTITIONER

## 2020-02-24 RX ORDER — SERTRALINE HYDROCHLORIDE 25 MG/1
25 TABLET, FILM COATED ORAL DAILY
Qty: 90 TABLET | Refills: 1 | Status: SHIPPED | OUTPATIENT
Start: 2020-02-24 | End: 2020-04-13 | Stop reason: SDUPTHER

## 2020-02-24 RX ORDER — AMITRIPTYLINE HYDROCHLORIDE 75 MG/1
75 TABLET, FILM COATED ORAL NIGHTLY
Qty: 90 TABLET | Refills: 1 | Status: SHIPPED | OUTPATIENT
Start: 2020-02-24 | End: 2020-12-27 | Stop reason: SDUPTHER

## 2020-02-24 RX ORDER — SUMATRIPTAN 25 MG/1
25 TABLET, FILM COATED ORAL
Qty: 9 TABLET | Refills: 5 | Status: SHIPPED | OUTPATIENT
Start: 2020-02-24 | End: 2021-08-09

## 2020-02-24 ASSESSMENT — PATIENT HEALTH QUESTIONNAIRE - PHQ9
SUM OF ALL RESPONSES TO PHQ9 QUESTIONS 1 & 2: 0
SUM OF ALL RESPONSES TO PHQ QUESTIONS 1-9: 0
SUM OF ALL RESPONSES TO PHQ QUESTIONS 1-9: 0
1. LITTLE INTEREST OR PLEASURE IN DOING THINGS: 0
2. FEELING DOWN, DEPRESSED OR HOPELESS: 0

## 2020-02-24 ASSESSMENT — ENCOUNTER SYMPTOMS
RESPIRATORY NEGATIVE: 1
ABDOMINAL PAIN: 0
PHOTOPHOBIA: 1
ABDOMINAL DISTENTION: 0

## 2020-02-24 NOTE — PATIENT INSTRUCTIONS
flashing lights or dark spots, or sensitivity to bright light or loud noise. Note if the headache occurred near your period. List anything that might have triggered the headache. Triggers may include certain foods (chocolate, cheese, wine) or odors, smoke, bright light, stress, or lack of sleep. · If your doctor has prescribed medicine for your migraines, take it as directed. You may have medicine that you take only when you get a migraine and medicine that you take all the time to help prevent migraines. ? If your doctor has prescribed medicine for when you get a headache, take it at the first sign of a migraine, unless your doctor has given you other instructions. ? If your doctor has prescribed medicine to prevent migraines, take it exactly as prescribed. Call your doctor if you think you are having a problem with your medicine. · Find healthy ways to deal with stress. Migraines are most common during or right after stressful times. Take time to relax before and after you do something that has caused a migraine in the past.  · Try to keep your muscles relaxed by keeping good posture. Check your jaw, face, neck, and shoulder muscles for tension. Try to relax them. When you sit at a desk, change positions often. And make sure to stretch for 30 seconds each hour. · Get plenty of sleep and exercise. · Eat meals on a regular schedule. Avoid foods and drinks that often trigger migraines. These include chocolate, alcohol (especially red wine and port), aspartame, monosodium glutamate (MSG), and some additives found in foods (such as hot dogs, shi, cold cuts, aged cheeses, and pickled foods). · Limit caffeine. Don't drink too much coffee, tea, or soda. But don't quit caffeine suddenly. That can also give you migraines. · Do not smoke or allow others to smoke around you. If you need help quitting, talk to your doctor about stop-smoking programs and medicines.  These can increase your chances of quitting for good.  · If you are taking birth control pills or hormone therapy, talk to your doctor about whether they are triggering your migraines. When should you call for help? Call 911 anytime you think you may need emergency care. For example, call if:    · You have signs of a stroke. These may include:  ? Sudden numbness, paralysis, or weakness in your face, arm, or leg, especially on only one side of your body. ? Sudden vision changes. ? Sudden trouble speaking. ? Sudden confusion or trouble understanding simple statements. ? Sudden problems with walking or balance. ? A sudden, severe headache that is different from past headaches.    Call your doctor now or seek immediate medical care if:    · You have new or worse nausea and vomiting.     · You have a new or higher fever.     · Your headache gets much worse.    Watch closely for changes in your health, and be sure to contact your doctor if:    · You are not getting better after 2 days (48 hours). Where can you learn more? Go to https://[employer]. org and sign in to your [employer] account. Enter J873 in the [employer] box to learn more about \"Migraine Headache: Care Instructions. \"     If you do not have an account, please click on the \"Sign Up Now\" link. Current as of: March 28, 2019  Content Version: 12.3  © 6420-7533 Healthwise, Incorporated. Care instructions adapted under license by [facility] ([facility]). If you have questions about a medical condition or this instruction, always ask your healthcare professional. [place] [address] any warranty or liability for your use of this information.

## 2020-02-24 NOTE — PROGRESS NOTES
High Blood Pressure Mother     Depression Mother     Anxiety Disorder Mother     Cancer Maternal Grandmother     Cancer Maternal Grandfather      Social History     Tobacco Use    Smoking status: Former Smoker     Packs/day: 0.03     Types: Cigarettes     Last attempt to quit: 2018     Years since quittin.3    Smokeless tobacco: Never Used   Substance Use Topics    Alcohol use: Yes     Comment: rare        Allergies   Allergen Reactions    Pcn [Penicillins] Rash       Health Maintenance   Topic Date Due    Pneumococcal 0-64 years Vaccine (1 of 1 - PPSV23) 1990    HIV screen  1999    DTaP/Tdap/Td vaccine (2 - Td) 2023    Shingles Vaccine (1 of 2) 2034    Flu vaccine  Completed    Hepatitis A vaccine  Aged Out    Hepatitis B vaccine  Aged Out    Hib vaccine  Aged Out    Meningococcal (ACWY) vaccine  Aged Out    Varicella vaccine  Discontinued       Subjective:      Review of Systems   Constitutional: Negative for chills, fatigue and fever. HENT: Negative. Eyes: Positive for photophobia. Negative for visual disturbance. Respiratory: Negative. Cardiovascular: Negative. Gastrointestinal: Negative for abdominal distention and abdominal pain. Genitourinary: Negative for difficulty urinating and dysuria. Skin: Negative. Neurological: Positive for headaches. Negative for dizziness, facial asymmetry and weakness. Psychiatric/Behavioral: Negative for self-injury, sleep disturbance and suicidal ideas. Objective:      BP 98/60   Pulse 103   Temp 98 °F (36.7 °C) (Oral)   Resp 16   Ht 5' 10\" (1.778 m)   Wt 166 lb 6.4 oz (75.5 kg)   BMI 23.88 kg/m²      Physical Exam  Vitals signs and nursing note reviewed. HENT:      Right Ear: Hearing, tympanic membrane, ear canal and external ear normal.      Left Ear: Hearing, tympanic membrane, ear canal and external ear normal.      Nose: Nose normal.      Mouth/Throat:      Lips: Pink.       Pharynx: Oropharynx is clear. Tonsils: No tonsillar exudate or tonsillar abscesses. Cardiovascular:      Rate and Rhythm: Normal rate and regular rhythm. Pulses: Normal pulses. Heart sounds: Normal heart sounds. No murmur. Pulmonary:      Effort: Pulmonary effort is normal. No respiratory distress. Breath sounds: Normal breath sounds. No wheezing. Abdominal:      General: Bowel sounds are normal. There is no distension. Palpations: Abdomen is soft. Tenderness: There is no abdominal tenderness. Skin:     General: Skin is warm and dry. Capillary Refill: Capillary refill takes less than 2 seconds. Neurological:      General: No focal deficit present. Mental Status: He is oriented to person, place, and time. Mental status is at baseline. Cranial Nerves: Cranial nerves are intact. Sensory: Sensation is intact. Motor: Motor function is intact. Coordination: Coordination is intact. Gait: Gait is intact. Psychiatric:         Mood and Affect: Mood normal.         Behavior: Behavior normal.         Thought Content: Thought content normal.         Judgment: Judgment normal.          Assessment/Plan:           1. Migraine without aura and without status migrainosus, not intractable    - amitriptyline (ELAVIL) 75 MG tablet; Take 1 tablet by mouth nightly  Dispense: 90 tablet; Refill: 1    2. Psychophysiological insomnia  Stable, continue current meds    3. Anxiety  Chronic conditions stable continue meds as ordered  - sertraline (ZOLOFT) 25 MG tablet; Take 1 tablet by mouth daily Along with the 50 mg tablet  Dispense: 90 tablet; Refill: 1  - amitriptyline (ELAVIL) 75 MG tablet; Take 1 tablet by mouth nightly  Dispense: 90 tablet; Refill: 1      Return in about 6 months (around 8/24/2020) for med refill.     Reccommended tobaccocessation options including pharmacologic methods, counseled great than 3 minutesduring this visit:  Yes[]  No  []       Patient given educational materials -see patient instructions. Discussed use, benefit, and side effects of prescribedmedications. All patient questions answered. Pt voiced understanding. Reviewedhealth maintenance. Instructed to continue current medications, diet and exercise. Patient agreed with treatment plan. Follow up as directed.        Electronicallysigned by ROJELIO Hare CNP on 2/24/2020 at 4:53 PM

## 2020-04-13 RX ORDER — SERTRALINE HYDROCHLORIDE 25 MG/1
25 TABLET, FILM COATED ORAL DAILY
Qty: 90 TABLET | Refills: 1 | Status: SHIPPED | OUTPATIENT
Start: 2020-04-13 | End: 2020-08-24 | Stop reason: SINTOL

## 2020-04-13 NOTE — TELEPHONE ENCOUNTER
Maria G Wolfe called requesting a refill on the following medications:  Requested Prescriptions     Pending Prescriptions Disp Refills    sertraline (ZOLOFT) 50 MG tablet 90 tablet 1     Sig: Take 1 tablet by mouth daily    sertraline (ZOLOFT) 25 MG tablet 90 tablet 1     Sig: Take 1 tablet by mouth daily Along with the 50 mg tablet     Pharmacy verified:  .[facility] [address]    Date of last visit: 02/24/2020  Date of next visit (if applicable): Visit date not found

## 2020-06-16 ENCOUNTER — TELEMEDICINE (OUTPATIENT)
Dept: FAMILY MEDICINE CLINIC | Age: 35
End: 2020-06-16
Payer: COMMERCIAL

## 2020-06-16 PROCEDURE — 99213 OFFICE O/P EST LOW 20 MIN: CPT | Performed by: NURSE PRACTITIONER

## 2020-06-16 RX ORDER — LAMOTRIGINE 25 MG/1
25 TABLET ORAL DAILY
Qty: 30 TABLET | Refills: 3 | Status: SHIPPED | OUTPATIENT
Start: 2020-06-16 | End: 2020-08-24

## 2020-06-16 ASSESSMENT — ENCOUNTER SYMPTOMS: RESPIRATORY NEGATIVE: 1

## 2020-06-16 NOTE — PROGRESS NOTES
Mental status  [x] Alert and awake  [x] Oriented to person/place/time []  Pulmonary/Chest: [x] Respiratory effort normal.  [x] No visualized signs of difficulty breathing or respiratory distress        []          Skin:        [x] No significant exanthematous lesions or discoloration noted on facial skin         []            Psychiatric:       [x] Normal Affect [x] No Hallucinations        [x] Abnormal- appears a little anxious    Other pertinent observable physical exam findings-     ASSESSMENT/PLAN:  1. MASON (generalized anxiety disorder)  Continue with zoloft 75 mg daily  Add lamictal  Pt in agreement with plan    2. Mood swings  As above      No follow-ups on file. Eduin Herzog is a 38 y.o. male being evaluated by a Virtual Visit (video visit) encounter to address concerns as mentioned above. A caregiver was present when appropriate. Due to this being a TeleHealth encounter (During [place] [address] public [facility] emergency), evaluation of the following organ systems was limited: Vitals/Constitutional/EENT/Resp/CV/GI//MS/Neuro/Skin/Heme-Lymph-Imm. Pursuant to the emergency declaration under the [facility], [address] [place] [address] authority and the [employer] Act, this Virtual Visit was conducted with patient's (and/or legal guardian's) consent, to reduce the patient's risk of exposure to COVID-19 and provide necessary medical care. The patient (and/or legal guardian) has also been advised to contact this office for worsening conditions or problems, and seek emergency medical treatment and/or call 911 if deemed necessary. Patient identification was verified at the start of the visit: Yes    Total time spent on this encounter: Not billed by time    Services were provided through a video synchronous discussion virtually to substitute for in-person clinic visit. Patient and provider were located at their individual homes.     --Kareem

## 2020-08-24 ENCOUNTER — TELEMEDICINE (OUTPATIENT)
Dept: FAMILY MEDICINE CLINIC | Age: 35
End: 2020-08-24
Payer: COMMERCIAL

## 2020-08-24 PROCEDURE — 99214 OFFICE O/P EST MOD 30 MIN: CPT | Performed by: NURSE PRACTITIONER

## 2020-08-24 RX ORDER — LAMOTRIGINE 50 MG/1
1 TABLET, ORALLY DISINTEGRATING ORAL DAILY
Qty: 90 TABLET | Refills: 1 | Status: SHIPPED | OUTPATIENT
Start: 2020-08-24 | End: 2020-08-25

## 2020-08-24 RX ORDER — BUPROPION HYDROCHLORIDE 150 MG/1
150 TABLET ORAL EVERY MORNING
Qty: 30 TABLET | Refills: 3 | Status: SHIPPED | OUTPATIENT
Start: 2020-08-24 | End: 2021-01-15 | Stop reason: SDUPTHER

## 2020-08-24 ASSESSMENT — ENCOUNTER SYMPTOMS: RESPIRATORY NEGATIVE: 1

## 2020-08-24 NOTE — PROGRESS NOTES
2020    TELEHEALTH EVALUATION -- Audio/Visual (During RATPX-90 public health emergency)    HPI:  Visit conducted with pt at home and provider Anthony Quan CNP in office. Danelle Alfred (:  1985) has requested an audio/video evaluation for the following concern(s):    Pt her to discuss his meds . States he is taking the zoloft 75 mg at night and he thinks it is causing him to have performance issues, is unable to maintain an erection and have an orgasm, he would like to stop this med. Also states since adding lamictal he does not notice any improvement in symptoms. Continues to have anxiety with panic attacks two times a week and having anger issues. Anxiety     HPI:    Inciting events or triggers for anxiety - work stressors   Frequency of anxiety - daily  Panic attacks? Yes  Symptoms of panic attacks -  difficulty concentrating, racing thoughts, shortness of breath and sweating  Sleep Disturbances? No  Impaired concentration? Yes - some days  Substance abuse? No  Suicidal/Homicidal Ideation? No    Compliant with meds: yes  Med side effects: No    Sees therapist?: No  Family History of Mental Illness? Yes    States he gets angry at times, no physical abuse, but gets mad easily for no reason    He continues to take elavil at night to help him sleep which is working. Review of Systems   Constitutional: Negative for fatigue. Respiratory: Negative. Cardiovascular: Negative. Skin: Negative. Psychiatric/Behavioral: Positive for agitation, decreased concentration and dysphoric mood. Negative for behavioral problems, sleep disturbance and suicidal ideas. The patient is nervous/anxious. Prior to Visit Medications    Medication Sig Taking?  Authorizing Provider   buPROPion (WELLBUTRIN XL) 150 MG extended release tablet Take 1 tablet by mouth every morning Yes ROJELIO Us CNP   lamoTRIgine 50 MG TBDP Take 1 tablet by mouth daily Yes ROJELIO Us CNP   sertraline (ZOLOFT) 50 MG tablet Take 1 tablet by mouth daily  ROJELIO Roberts CNP   SUMAtriptan (IMITREX) 25 MG tablet Take 1 tablet by mouth once as needed for Migraine  ROJELIO Franklin CNP   amitriptyline (ELAVIL) 75 MG tablet Take 1 tablet by mouth nightly  ROJELIO Franklin CNP   ibuprofen (ADVIL;MOTRIN) 600 MG tablet Take 1 tablet by mouth every 8 hours as needed for Pain  Carley Jay MD   acetaminophen (APAP EXTRA STRENGTH) 500 MG tablet Take 2 tablets by mouth every 8 hours as needed for Pain  Carley Jay MD       Social History     Tobacco Use    Smoking status: Former Smoker     Packs/day: 0.03     Types: Cigarettes     Last attempt to quit: 2018     Years since quittin.8    Smokeless tobacco: Never Used   Substance Use Topics    Alcohol use: Yes     Comment: rare    Drug use: Yes     Types: Marijuana     Comment: occasional        Allergies   Allergen Reactions    Pcn [Penicillins] Rash   ,   Past Medical History:   Diagnosis Date    Anxiety     Asthma     when he was younger he was told he had asthma     Concussion 2019    Depression     Migraine     Obstructive sleep apnea 2014    Tobacco abuse    ,   Past Surgical History:   Procedure Laterality Date    VASECTOMY  2018   ,   Social History     Tobacco Use    Smoking status: Former Smoker     Packs/day: 0.03     Types: Cigarettes     Last attempt to quit: 2018     Years since quittin.8    Smokeless tobacco: Never Used   Substance Use Topics    Alcohol use: Yes     Comment: rare    Drug use: Yes     Types: Marijuana     Comment: occasional   ,   Family History   Problem Relation Age of Onset    Other Mother         hepatitis     Diabetes Mother     High Blood Pressure Mother     Depression Mother     Anxiety Disorder Mother     Cancer Maternal Grandmother     Cancer Maternal Grandfather    ,   Immunization History   Administered Date(s) Administered    Influenza, Quadv, IM, PF (6 mo and older Fluzone, Flulaval, Fluarix, and 3 yrs and older Afluria) 10/21/2019    Tdap (Boostrix, Adacel) 08/26/2013   ,   Health Maintenance   Topic Date Due    Pneumococcal 0-64 years Vaccine (1 of 1 - PPSV23) 01/06/1990    HIV screen  01/06/1999    Flu vaccine (1) 09/01/2020    DTaP/Tdap/Td vaccine (2 - Td) 08/26/2023    Hepatitis A vaccine  Aged Out    Hepatitis B vaccine  Aged Out    Hib vaccine  Aged Out    Meningococcal (ACWY) vaccine  Aged Out    Varicella vaccine  Discontinued       PHYSICAL EXAMINATION:  [ INSTRUCTIONS:  \"[x]\" Indicates a positive item  \"[]\" Indicates a negative item  -- DELETE ALL ITEMS NOT EXAMINED]  Vital Signs: (As obtained by patient/caregiver or practitioner observation)    Blood pressure-  Heart rate-    Respiratory rate-    Temperature-  Pulse oximetry-     Constitutional: [x] Appears well-developed and well-nourished [x] No apparent distress      [] Abnormal-   Mental status  [x] Alert and awake  [x] Oriented to person/place/time []Able to follow commands      Eyes:  EOM    []  Normal  [] Abnormal-  Sclera  [x]  Normal  [] Abnormal -         Discharge []  None visible  [] Abnormal -    HENT:   [] Normocephalic, atraumatic.   [] Abnormal   [] Mouth/Throat: Mucous membranes are moist.     External Ears [] Normal  [] Abnormal-     Neck: [] No visualized mass     Pulmonary/Chest: [x] Respiratory effort normal.  [x] No visualized signs of difficulty breathing or respiratory distress        [] Abnormal-      Musculoskeletal:   [] Normal gait with no signs of ataxia         [] Normal range of motion of neck        [] Abnormal-       Neurological:        [] No Facial Asymmetry (Cranial nerve 7 motor function) (limited exam to video visit)          [] No gaze palsy        [] Abnormal-         Skin:        [x] No significant exanthematous lesions or discoloration noted on facial skin         [] Abnormal-            Psychiatric:       [x] Normal Affect [x] No Hallucinations        [x] Abnormal- does appear to be anxious appears to be angry at times. Other pertinent observable physical exam findings-     ASSESSMENT/PLAN:  1. Anxiety  Will add wellbutrin and increase lamictal  - lamoTRIgine 50 MG TBDP; Take 1 tablet by mouth daily  Dispense: 90 tablet; Refill: 1    2. Mood swings    - lamoTRIgine 50 MG TBDP; Take 1 tablet by mouth daily  Dispense: 90 tablet; Refill: 1    3. Anger  Increase lamictal to 50 mg daily  - lamoTRIgine 50 MG TBDP; Take 1 tablet by mouth daily  Dispense: 90 tablet; Refill: 1  Wants to stop zoloft   4. Difficulty attaining erection    Wean off zoloft, instrucitions given to him. Stop zoloft 25 mg tonight, in 10 days stop 50 mg. Start wellbutrin tomorrow. Return in about 6 weeks (around 10/5/2020). Pt in agreement with plan. Call office in 2 weeks with update. Julianne Covarrubias is a 38 y.o. male being evaluated by a Virtual Visit (video visit) encounter to address concerns as mentioned above. A caregiver was present when appropriate. Due to this being a TeleHealth encounter (During HBKHB-32 public health emergency), evaluation of the following organ systems was limited: Vitals/Constitutional/EENT/Resp/CV/GI//MS/Neuro/Skin/Heme-Lymph-Imm. Pursuant to the emergency declaration under the [facility], [address] [place] [address] and the [employer] and [employer] Act, this Virtual Visit was conducted with patient's (and/or legal guardian's) consent, to reduce the patient's risk of exposure to COVID-19 and provide necessary medical care. The patient (and/or legal guardian) has also been advised to contact this office for worsening conditions or problems, and seek emergency medical treatment and/or call 911 if deemed necessary.      Patient identification was verified at the start of the visit: Yes    Total time spent on this encounter: Not billed by time    Services were provided through a video synchronous discussion virtually to substitute for in-person clinic visit. Patient and provider were located at their individual homes. --ROJELIO Bustillo CNP on 8/24/2020 at 5:06 PM    An electronic signature was used to authenticate this note.

## 2020-08-25 ENCOUNTER — TELEPHONE (OUTPATIENT)
Dept: FAMILY MEDICINE CLINIC | Age: 35
End: 2020-08-25

## 2020-08-25 RX ORDER — LAMOTRIGINE 50 MG/1
50 TABLET, EXTENDED RELEASE ORAL DAILY
Qty: 30 TABLET | Refills: 3 | Status: SHIPPED | OUTPATIENT
Start: 2020-08-25 | End: 2020-08-26

## 2020-08-25 NOTE — TELEPHONE ENCOUNTER
Message   Received: Yesterday   Message Contents   Simona Gee, APRN - CNP  [facility]               Call pt to make 6 week f/u appt can be VV for beginning of October

## 2020-08-25 NOTE — TELEPHONE ENCOUNTER
Ana from [facility] called- LamoTRIgine ordered is disintegrating tablet which is $500- is regular tablet ok?  Much cheaper     Please advise

## 2020-08-25 NOTE — TELEPHONE ENCOUNTER
Pt's pharmacy called back stating the lamictal does not come in a regular 50mg tab. It would either need to be a 50mg ER or they could give the pt 25mg tabs & he would need to take two of them per day. Please advise.

## 2020-08-26 ENCOUNTER — TELEPHONE (OUTPATIENT)
Dept: FAMILY MEDICINE CLINIC | Age: 35
End: 2020-08-26

## 2020-08-26 RX ORDER — LAMOTRIGINE 25 MG/1
50 TABLET ORAL DAILY
Qty: 120 TABLET | Refills: 3 | Status: SHIPPED | OUTPATIENT
Start: 2020-08-26 | End: 2020-12-27 | Stop reason: SDUPTHER

## 2020-08-26 NOTE — TELEPHONE ENCOUNTER
CLARIFICATION LAMICTAL XR 50MG TAB 08/26/20. Fax stating, this medication is over $200. Do you just want to increase the  25mg tabs to  2 QD or do you want the XR formulation ? Thanks . See attached for details .

## 2020-10-27 ENCOUNTER — VIRTUAL VISIT (OUTPATIENT)
Dept: FAMILY MEDICINE CLINIC | Age: 35
End: 2020-10-27
Payer: COMMERCIAL

## 2020-10-27 ENCOUNTER — TELEPHONE (OUTPATIENT)
Dept: FAMILY MEDICINE CLINIC | Age: 35
End: 2020-10-27

## 2020-10-27 ENCOUNTER — HOSPITAL ENCOUNTER (OUTPATIENT)
Age: 35
Discharge: HOME | End: 2020-10-27
Payer: COMMERCIAL

## 2020-10-27 DIAGNOSIS — R09.81 NASAL CONGESTION: ICD-10-CM

## 2020-10-27 DIAGNOSIS — R52 BODY ACHES: ICD-10-CM

## 2020-10-27 DIAGNOSIS — J02.9 PHARYNGITIS, UNSPECIFIED ETIOLOGY: ICD-10-CM

## 2020-10-27 LAB
GROUP A STREP CULTURE, REFLEX: NEGATIVE
REFLEX THROAT C + S: NORMAL

## 2020-10-27 PROCEDURE — U0003 INFECTIOUS AGENT DETECTION BY NUCLEIC ACID (DNA OR RNA); SEVERE ACUTE RESPIRATORY SYNDROME CORONAVIRUS 2 (SARS-COV-2) (CORONAVIRUS DISEASE [COVID-19]), AMPLIFIED PROBE TECHNIQUE, MAKING USE OF HIGH THROUGHPUT TECHNOLOGIES AS DESCRIBED BY CMS-2020-01-R: HCPCS

## 2020-10-27 PROCEDURE — 99213 OFFICE O/P EST LOW 20 MIN: CPT | Performed by: NURSE PRACTITIONER

## 2020-10-27 PROCEDURE — 99211 OFF/OP EST MAY X REQ PHY/QHP: CPT

## 2020-10-27 PROCEDURE — 87880 STREP A ASSAY W/OPTIC: CPT

## 2020-10-27 PROCEDURE — 87070 CULTURE OTHR SPECIMN AEROBIC: CPT

## 2020-10-27 RX ORDER — ALBUTEROL SULFATE 90 UG/1
2 AEROSOL, METERED RESPIRATORY (INHALATION) EVERY 6 HOURS PRN
Qty: 1 INHALER | Refills: 3 | Status: SHIPPED | OUTPATIENT
Start: 2020-10-27 | End: 2022-01-17

## 2020-10-27 ASSESSMENT — ENCOUNTER SYMPTOMS
SORE THROAT: 1
WHEEZING: 1
SHORTNESS OF BREATH: 0
COUGH: 1
RHINORRHEA: 1
TROUBLE SWALLOWING: 0

## 2020-10-27 NOTE — PROGRESS NOTES
10/27/2020    TELEHEALTH EVALUATION -- Audio/Visual (During NDQPN-87 public health emergency)    HPI:visit conducted with pt at home and provider Belen Pavon CNP in office. Orville Maria (:  1985) has requested an audio/video evaluation for the following concern(s):    Sore Throat    HPI:      Symptoms have been present for 2 day(s). Fever? He does not think so  Odynophagia? No  Dysphagia? Yes  Cough? Yes  Rhinitis? Yes  Hx of EBV? No  Sick Contacts? No    Pt denies SOB,states he is wheezing off and on,no stridor, nausea or vomiting. NO covid contacts that he know of. Has not had any fever reducing meds, no fevers but body aches, nasal congestion and a productive cough. Review of Systems   Constitutional: Positive for fatigue. Negative for chills and fever. HENT: Positive for congestion, postnasal drip, rhinorrhea and sore throat. Negative for tinnitus and trouble swallowing. Respiratory: Positive for cough and wheezing. Negative for shortness of breath. Cardiovascular: Negative. Genitourinary: Negative for difficulty urinating. Musculoskeletal: Positive for arthralgias and myalgias. Skin: Negative. Neurological: Positive for headaches. Negative for dizziness and facial asymmetry. Prior to Visit Medications    Medication Sig Taking?  Authorizing Provider   albuterol sulfate HFA (PROVENTIL HFA) 108 (90 Base) MCG/ACT inhaler Inhale 2 puffs into the lungs every 6 hours as needed for Wheezing Yes Apolonia Knee, APRLUKASZ - CNP   lamoTRIgine (LAMICTAL) 25 MG tablet Take 2 tablets by mouth daily  ROJELIO Roberts CNP   buPROPion (WELLBUTRIN XL) 150 MG extended release tablet Take 1 tablet by mouth every morning  Apolonia Knee, ROJELIO - CNP   sertraline (ZOLOFT) 50 MG tablet Take 1 tablet by mouth daily  Apolonia KneeROJELIO CNP   SUMAtriptan (IMITREX) 25 MG tablet Take 1 tablet by mouth once as needed for Migraine  Apolonia Knee, ROJELIO - CNP   amitriptyline (ELAVIL) 75 MG tablet Take 1 tablet by mouth nightly  Nancy Dc APRN - CNP   ibuprofen (ADVIL;MOTRIN) 600 MG tablet Take 1 tablet by mouth every 8 hours as needed for Pain  Nadira Jo MD   acetaminophen (APAP EXTRA STRENGTH) 500 MG tablet Take 2 tablets by mouth every 8 hours as needed for Pain  Nadira Jo MD       Social History     Tobacco Use    Smoking status: Former Smoker     Packs/day: 0.03     Types: Cigarettes     Last attempt to quit: 2018     Years since quittin.9    Smokeless tobacco: Never Used   Substance Use Topics    Alcohol use: Yes     Comment: rare    Drug use: Yes     Types: Marijuana     Comment: occasional        Allergies   Allergen Reactions    Pcn [Penicillins] Rash   ,   Past Medical History:   Diagnosis Date    Anxiety     Asthma     when he was younger he was told he had asthma     Concussion 2019    Depression     Migraine     Obstructive sleep apnea 2014    Tobacco abuse    ,   Past Surgical History:   Procedure Laterality Date    VASECTOMY  2018   ,   Social History     Tobacco Use    Smoking status: Former Smoker     Packs/day: 0.03     Types: Cigarettes     Last attempt to quit: 2018     Years since quittin.9    Smokeless tobacco: Never Used   Substance Use Topics    Alcohol use: Yes     Comment: rare    Drug use: Yes     Types: Marijuana     Comment: occasional   ,   Family History   Problem Relation Age of Onset    Other Mother         hepatitis     Diabetes Mother     High Blood Pressure Mother     Depression Mother     Anxiety Disorder Mother     Cancer Maternal Grandmother     Cancer Maternal Grandfather    ,   Immunization History   Administered Date(s) Administered    Influenza, Quadv, IM, PF (6 mo and older Fluzone, Flulaval, Fluarix, and 3 yrs and older Afluria) 10/21/2019    Tdap (Boostrix, Adacel) 2013   ,   Health Maintenance   Topic Date Due    Pneumococcal 0-64 years Vaccine ( - dee  Work letter written he is to remain out of work until test results have come back   Pt in agreement with plan. Work letter written. .   - COVID-19 Ambulatory; Future  - Group A Strep, Reflex    2. Body aches  Tylenol or motrin  - COVID-19 Ambulatory; Future    3. Nasal congestion  Nasal spray  claritin  - COVID-19 Ambulatory; Future    4. Wheezing  Albuterol  Contact office if it worsens. No follow-ups on file. Steve Morrell is a 38 y.o. male being evaluated by a Virtual Visit (video visit) encounter to address concerns as mentioned above. A caregiver was present when appropriate. Due to this being a TeleHealth encounter ([facility]), evaluation of the following organ systems was limited: Vitals/Constitutional/EENT/Resp/CV/GI//MS/Neuro/Skin/Heme-Lymph-Imm. Pursuant to the emergency declaration under the [address] [place] [address] and the [employer] and [employer] Act, this Virtual Visit was conducted with patient's (and/or legal guardian's) consent, to reduce the patient's risk of exposure to COVID-19 and provide necessary medical care. The patient (and/or legal guardian) has also been advised to contact this office for worsening conditions or problems, and seek emergency medical treatment and/or call 911 if deemed necessary. Patient identification was verified at the start of the visit: Yes    Total time spent on this encounter: Not billed by time    Services were provided through a video synchronous discussion virtually to substitute for in-person clinic visit. Patient and provider were located at their individual homes. --ROJELIO Villanueva - CNP on 10/27/2020 at 11:22 AM    An electronic signature was used to authenticate this note.

## 2020-10-27 NOTE — TELEPHONE ENCOUNTER
Pt informed, neg result letter faxed to [facility] as requested ([phone]) by Navjot Tripathi.   Would like Covid results faxed also to work - Sujit Chandler

## 2020-10-27 NOTE — LETTER
[address] [facility]  [address] [place] [address]  Phone: [phone]  Fax: [phone]    Tala Pierre LPN        October 27, 2020    Steve Morrell  [address] [place] [address] [address] [address]      Cisco Alfaro: This letter is to inform you that Arsalan Del Rio tested negative for Strep Throat, we are waiting results of the Covid test and will release those when they come in per his request.  Those results are expected in the next 3-5 days. If you have any questions or concerns, please don't hesitate to call.     Sincerely,        Tala Pierre LPN

## 2020-10-27 NOTE — TELEPHONE ENCOUNTER
----- Message from ROJELIO Maravilla CNP sent at 10/27/2020 12:46 PM EDT -----  Let pt know his strep test is negative, we will be in touch with him when the covid test results are back

## 2020-10-27 NOTE — TELEPHONE ENCOUNTER
Pt woke up this morning with a runny nose, HA, body aches, and cough. Pt has no knowledge of being in contact with anyone that had any symptoms of COVID. Pt's employer is wanting pt to be tested for COVID.       Please advise

## 2020-10-27 NOTE — TELEPHONE ENCOUNTER
Patient states he is calling back to give the Fax number to [facility]/ [phone]

## 2020-10-27 NOTE — PROGRESS NOTES
Strep and covid swab collected and sent to lab. Pt kept gagging so tried a few times. Required PPE was worn.

## 2020-10-27 NOTE — LETTER
[address] [facility]  [address] [place] [address]. [facility] [address]  Phone: [phone]  Fax: [phone]    ROJELIO Alejandro CNP        October 27, 2020     Patient: Jaylan Li   YOB: 1985   Date of Visit: 10/27/2020       To Whom It May Concern: It is my medical opinion that Lex Molina should remain out of work until his test results have come back. .    If you have any questions or concerns, please don't hesitate to call.     Sincerely,        ROJELIO Alejandro CNP

## 2020-10-29 LAB
SARS-COV-2: NOT DETECTED
THROAT/NOSE CULTURE: NORMAL

## 2020-10-30 ENCOUNTER — TELEPHONE (OUTPATIENT)
Dept: FAMILY MEDICINE CLINIC | Age: 35
End: 2020-10-30

## 2020-10-30 NOTE — TELEPHONE ENCOUNTER
----- Message from ROJELIO Alejandro - CNP sent at 10/30/2020 11:01 AM EDT -----  Let pt know covid is negative let me know if he has any questions.  If he has not had any known exposures he can go back to work Monday

## 2020-12-28 RX ORDER — AMITRIPTYLINE HYDROCHLORIDE 75 MG/1
75 TABLET, FILM COATED ORAL NIGHTLY
Qty: 90 TABLET | Refills: 1 | Status: SHIPPED | OUTPATIENT
Start: 2020-12-28 | End: 2021-02-11 | Stop reason: SDUPTHER

## 2020-12-28 RX ORDER — LAMOTRIGINE 25 MG/1
50 TABLET ORAL DAILY
Qty: 120 TABLET | Refills: 3 | Status: SHIPPED | OUTPATIENT
Start: 2020-12-28 | End: 2021-09-06 | Stop reason: SDUPTHER

## 2021-01-10 ENCOUNTER — PATIENT MESSAGE (OUTPATIENT)
Dept: FAMILY MEDICINE CLINIC | Age: 36
End: 2021-01-10
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
## 2021-01-14 ENCOUNTER — OFFICE VISIT (OUTPATIENT)
Dept: FAMILY MEDICINE CLINIC | Age: 36
End: 2021-01-14
Payer: COMMERCIAL

## 2021-01-14 ENCOUNTER — TELEPHONE (OUTPATIENT)
Dept: FAMILY MEDICINE CLINIC | Age: 36
End: 2021-01-14

## 2021-01-14 VITALS
TEMPERATURE: 97.4 F | RESPIRATION RATE: 14 BRPM | SYSTOLIC BLOOD PRESSURE: 108 MMHG | BODY MASS INDEX: 25.18 KG/M2 | OXYGEN SATURATION: 98 % | HEIGHT: 69 IN | DIASTOLIC BLOOD PRESSURE: 82 MMHG | HEART RATE: 124 BPM | WEIGHT: 170 LBS

## 2021-01-14 DIAGNOSIS — I51.7 RIGHT VENTRICULAR HYPERTROPHY: ICD-10-CM

## 2021-01-14 DIAGNOSIS — R00.0 TACHYCARDIA: ICD-10-CM

## 2021-01-14 DIAGNOSIS — S09.90XD TRAUMATIC INJURY OF HEAD, SUBSEQUENT ENCOUNTER: ICD-10-CM

## 2021-01-14 DIAGNOSIS — F41.9 ANXIETY: ICD-10-CM

## 2021-01-14 DIAGNOSIS — Z09 HOSPITAL DISCHARGE FOLLOW-UP: Primary | ICD-10-CM

## 2021-01-14 DIAGNOSIS — F07.81 POST CONCUSSIVE SYNDROME: ICD-10-CM

## 2021-01-14 PROCEDURE — 99496 TRANSJ CARE MGMT HIGH F2F 7D: CPT | Performed by: NURSE PRACTITIONER

## 2021-01-14 PROCEDURE — 1111F DSCHRG MED/CURRENT MED MERGE: CPT | Performed by: NURSE PRACTITIONER

## 2021-01-14 PROCEDURE — 93000 ELECTROCARDIOGRAM COMPLETE: CPT | Performed by: NURSE PRACTITIONER

## 2021-01-14 ASSESSMENT — PATIENT HEALTH QUESTIONNAIRE - PHQ9
2. FEELING DOWN, DEPRESSED OR HOPELESS: 0
SUM OF ALL RESPONSES TO PHQ QUESTIONS 1-9: 0
1. LITTLE INTEREST OR PLEASURE IN DOING THINGS: 0
SUM OF ALL RESPONSES TO PHQ QUESTIONS 1-9: 0
SUM OF ALL RESPONSES TO PHQ QUESTIONS 1-9: 0
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
## 2021-01-14 NOTE — LETTER
[address] [place] [address]. [facility] [address]  Phone: [phone]  Fax: [phone]    ROJELIO Fall CNP        January 14, 2021     Patient: Noe Chatman   YOB: 1985   Date of Visit: 1/14/2021       To Whom It May Concern: It is my medical opinion that Carolina De La O may return to work on Monday January 18th, 2021. .    If you have any questions or concerns, please don't hesitate to call.     Sincerely,        ROJELIO Fall CNP

## 2021-01-14 NOTE — PROGRESS NOTES
Post-Discharge Transitional Care Management Services or Hospital Follow Up      Fide Mitchell   YOB: 1985    Date of Office Visit:  1/14/2021  Date of Hospital Admission: 1/11/21  Date of Hospital Discharge: 1//12/21  Risk of hospital readmission (high >=14%.  Medium >=10%) :No data recorded    Care management risk score Rising risk (score 2-5) and Complex Care (Scores >=6): 5     Non face to face  following discharge, date last encounter closed (first attempt may have been earlier): *No documented post hospital discharge outreach found in the last 14 days    Call initiated 2 business days of discharge: *No response recorded in the last 14 days    Patient Active Problem List   Diagnosis    Tobacco abuse    Excessive sleepiness    Obstructive sleep apnea    Snores    Sleep disturbances    Has stopped breathing    Sleep talking    Bruxism    Restless sleeper    Asthma    Gastroesophageal reflux disease with esophagitis       Allergies   Allergen Reactions    Pcn [Penicillins] Rash       Medications listed as ordered at the time of discharge from hospital   [facility]   Home Medication Instructions ROB:    Printed on:01/14/21 4833   Medication Information                      acetaminophen (APAP EXTRA STRENGTH) 500 MG tablet  Take 2 tablets by mouth every 8 hours as needed for Pain             albuterol sulfate HFA (PROVENTIL HFA) 108 (90 Base) MCG/ACT inhaler  Inhale 2 puffs into the lungs every 6 hours as needed for Wheezing             amitriptyline (ELAVIL) 75 MG tablet  Take 1 tablet by mouth nightly             buPROPion (WELLBUTRIN XL) 150 MG extended release tablet  Take 1 tablet by mouth every morning             ibuprofen (ADVIL;MOTRIN) 600 MG tablet  Take 1 tablet by mouth every 8 hours as needed for Pain             lamoTRIgine (LAMICTAL) 25 MG tablet  Take 2 tablets by mouth daily             SUMAtriptan (IMITREX) 25 MG tablet Take 1 tablet by mouth once as needed for Migraine                   Medications marked \"taking\" at this time  Outpatient Medications Marked as Taking for the 1/14/21 encounter (Office Visit) with ROJELIO García CNP   Medication Sig Dispense Refill    amitriptyline (ELAVIL) 75 MG tablet Take 1 tablet by mouth nightly 90 tablet 1    lamoTRIgine (LAMICTAL) 25 MG tablet Take 2 tablets by mouth daily 120 tablet 3    albuterol sulfate HFA (PROVENTIL HFA) 108 (90 Base) MCG/ACT inhaler Inhale 2 puffs into the lungs every 6 hours as needed for Wheezing 1 Inhaler 3    buPROPion (WELLBUTRIN XL) 150 MG extended release tablet Take 1 tablet by mouth every morning 30 tablet 3    SUMAtriptan (IMITREX) 25 MG tablet Take 1 tablet by mouth once as needed for Migraine 9 tablet 5    ibuprofen (ADVIL;MOTRIN) 600 MG tablet Take 1 tablet by mouth every 8 hours as needed for Pain 90 tablet 5    acetaminophen (APAP EXTRA STRENGTH) 500 MG tablet Take 2 tablets by mouth every 8 hours as needed for Pain 180 tablet 5        Medications patient taking as of now reconciled against medications ordered at time of hospital discharge: Yes    Chief Complaint   Patient presents with    Follow-Up from [address] [place] [address] 11/12  hit head on saturday on a pipe, possible concussion, still feeling bad, no better    Rash     on chest, burns, not itchy, noticed it yesterday       History of Present illness - Follow up of Hospital diagnosis(es): blunt head trauma , headache    Inpatient course: Discharge summary reviewed- see chart. I did review all hospital notes, tests and labs. Pt had been with his brother working and was hit on the head by a steel pipe. Pt had a normal MRI of brain and a normal ct of head negative for any bleeds or masses or lesion. Pt had a fever at time of check in and had a lumbar puncture that was negative for meningitis. Pt was admitted overnight for IV atb and IV fluids. Pt had normal labs and was discharged to home. Pt did go to work today but continues to have a headache and feeling tired. Pt has had a concussion in past and states these headaches started 2 days after hitting on head. States the headache comes and goes, 5/10 denies vision changes or nausea or vomiting. Taking ibuprofen which does help to relieve a little bit. Pt does have tachycardia today, denies chest pain or heart palpitations. Interval history/Current status: stable     A comprehensive review of systems was negative except for what was noted in the HPI. Anxiety     HPI:    Inciting events or triggers for anxiety - everyday life   Frequency of anxiety - daily  Panic attacks? Yes  Symptoms of panic attacks -  chest pain, difficulty concentrating and racing thoughts  Sleep Disturbances? Yes  Impaired concentration? No  Substance abuse? No  Suicidal/Homicidal Ideation? No    Compliant with meds: no, pt does not think he is taking his meds right,  Med side effects: No    Sees therapist?: No  Family History of Mental Illness? Yes    Pt taking elavil 75 mg at night for sleep and states this does work. Is to be on 50 mg lamictal for anxiety but only taking 25 is taking wellbutrin does not feel depressed. Vitals:    01/14/21 1535   BP: 108/82   Pulse: 124   Resp: 14   Temp: 97.4 °F (36.3 °C)   TempSrc: Temporal   SpO2: 98%   Weight: 170 lb (77.1 kg)   Height: 5' 9\" (1.753 m)     Body mass index is 25.1 kg/m².    Wt Readings from Last 3 Encounters:   01/14/21 170 lb (77.1 kg)   02/24/20 166 lb 6.4 oz (75.5 kg)   10/21/19 149 lb 12.8 oz (67.9 kg)     BP Readings from Last 3 Encounters:   01/14/21 108/82   02/24/20 98/60   10/21/19 110/70        Physical Exam:  General Appearance: alert and oriented to person, place and time, well-developed and well-nourished, in no acute distress  Skin: warm and dry, no rash or erythema  Head: normocephalic and atraumatic Eyes: pupils equal, round, and reactive to light, extraocular eye movements intact, conjunctivae normal  ENT: tympanic membrane, external ear and ear canal normal bilaterally, oropharynx clear and moist with normal mucous membranes  Neck: neck supple and non tender without mass, no thyromegaly or thyroid nodules, no cervical lymphadenopathy   Pulmonary/Chest: clear to auscultation bilaterally- no wheezes, rales or rhonchi, normal air movement, no respiratory distress  Cardiovascular: normal S1 and S2, no gallops, intact distal pulses, no carotid bruits and tachycardia  Neurologic: reflexes normal and symmetric, no cranial nerve deficit, gait, coordination and speech normal, no cranial nerve deficit, gait and coordination normal, speech normal, vibratory sensation normal, muscle strength normal, Romberg negative, finger to nose normal, no tremor and CN III-XII intact no signs of impending hemorrhage. Assessment/Plan:  1. Hospital discharge follow-up    - CT DISCHARGE MEDS RECONCILED W/ CURRENT OUTPATIENT MED LIST    2. Traumatic injury of head, subsequent encounter  Brain bleed ruled out   Condition improving  If start vomiting or vision changes report to ER.   - CT DISCHARGE MEDS RECONCILED W/ CURRENT OUTPATIENT MED LIST    3. Post concussive syndrome  Continue to take tylenol for h/s  Brain rest no electronics or reading books for 3 days and then slowly reintroduce brain activities, if H/a returns go back to stopping again    4. Tachycardia  Anxiety vs heart arrhythmia   May need to start beta blocker vs more anxiett meds  - EKG 12 Lead  - ECHO Complete 2D W Doppler W Color; Future  - Holter Monitor 48 Hour; Future    5. Anxiety  will start taking meds correctly,     6.  Right ventricular hypertrophy  Echo to rule out right ventricular hypertrophy  Medical Decision Making: high complexity

## 2021-01-15 DIAGNOSIS — F33.41 RECURRENT MAJOR DEPRESSIVE DISORDER, IN PARTIAL REMISSION (HCC): Primary | ICD-10-CM

## 2021-01-15 RX ORDER — BUPROPION HYDROCHLORIDE 150 MG/1
150 TABLET ORAL EVERY MORNING
Qty: 90 TABLET | Refills: 3 | Status: SHIPPED | OUTPATIENT
Start: 2021-01-15 | End: 2022-01-11 | Stop reason: SDUPTHER

## 2021-01-29 ENCOUNTER — HOSPITAL ENCOUNTER (OUTPATIENT)
Dept: NON INVASIVE DIAGNOSTICS | Age: 36
Discharge: HOME | End: 2021-01-29
Payer: COMMERCIAL

## 2021-01-29 DIAGNOSIS — R00.0 TACHYCARDIA: ICD-10-CM

## 2021-01-29 LAB
LV EF: 55 %
LVEF MODALITY: NORMAL

## 2021-01-29 PROCEDURE — 93226 XTRNL ECG REC<48 HR SCAN A/R: CPT

## 2021-01-29 PROCEDURE — 93306 TTE W/DOPPLER COMPLETE: CPT

## 2021-01-29 PROCEDURE — 93225 XTRNL ECG REC<48 HRS REC: CPT

## 2021-01-29 NOTE — PROCEDURES
The skin was prepped and a holter monitor was applied. The patient was instructed on the documentation of symptoms and the purpose of the holter as well as the things to avoid while wearing the holter. The patient was instructed to remove and return the holter on 01/31/2021.   The serial number of the holter that was applied is [id number]

## 2021-02-01 ENCOUNTER — TELEPHONE (OUTPATIENT)
Dept: FAMILY MEDICINE CLINIC | Age: 36
End: 2021-02-01

## 2021-02-01 DIAGNOSIS — R00.0 SINUS TACHYCARDIA SEEN ON CARDIAC MONITOR: Primary | ICD-10-CM

## 2021-02-01 LAB
ACQUISITION DURATION: NORMAL S
AVERAGE HEART RATE: 100 BPM
HOOKUP DATE: NORMAL
HOOKUP TIME: NORMAL
MAX HEART RATE TIME/DATE: NORMAL
MAX HEART RATE: 185 BPM
MIN HEART RATE TIME/DATE: NORMAL
MIN HEART RATE: 53 BPM
NUMBER OF QRS COMPLEXES: NORMAL
NUMBER OF SUPRAVENTRICULAR BEATS IN RUNS: 0
NUMBER OF SUPRAVENTRICULAR COUPLETS: 0
NUMBER OF SUPRAVENTRICULAR ECTOPICS: 1
NUMBER OF SUPRAVENTRICULAR ISOLATED BEATS: 1
NUMBER OF SUPRAVENTRICULAR RUNS: 0
NUMBER OF VENTRICULAR BEATS IN RUNS: 0
NUMBER OF VENTRICULAR BIGEMINAL CYCLES: 0
NUMBER OF VENTRICULAR COUPLETS: 0
NUMBER OF VENTRICULAR ECTOPICS: 1
NUMBER OF VENTRICULAR ISOLATED BEATS: 1
NUMBER OF VENTRICULAR RUNS: 0

## 2021-02-01 NOTE — TELEPHONE ENCOUNTER
----- Message from ROJELIO Dacosta CNP sent at 1/30/2021  9:21 PM EST -----  Let pt know his echo of heart is normal. Normal heart function and heart output. All chambers and valves are working well, this is good news! I will be in touch with him when holter test is back.

## 2021-02-01 NOTE — TELEPHONE ENCOUNTER
----- Message from ROJELIO Ramirez CNP sent at 2/1/2021  1:22 PM EST -----  Let pt  Know his heart monitor test did identify a fast heart rate for him, it read as sinus tachycardia, which means fast heart rate over 80 there is on structural abnormality to any heart valves or chambers as evidenced by his recent echo. The fast heart rate is treated with a medication called metoprolol which helps to decrease the heart rate. I will order for him and then he will need a visit 2-3 weeks later to f/u on how he is doing with it.

## 2021-02-02 ENCOUNTER — TELEPHONE (OUTPATIENT)
Dept: FAMILY MEDICINE CLINIC | Age: 36
End: 2021-02-02

## 2021-02-02 DIAGNOSIS — Z72.0 TOBACCO ABUSE: Primary | ICD-10-CM

## 2021-02-02 NOTE — TELEPHONE ENCOUNTER
[facility] received a call from:    Name of Caller: Felix Dumas    Relationship to patient:Self    Organization name: [id number]    Best contact number: [phone]    Reason for call: Pt calling to ask if his PCP is able to prescribe him a nicotine patch, level 1. He is not sure if there is a brand that his insurance will cover. Pt has an appt on 2/11/21 at 4:20pm. Please advise.

## 2021-02-02 NOTE — TELEPHONE ENCOUNTER
Please ask pt how many cigarettes he smokes a day that determines what dose we start out with in other words 1/2 a pack a day or more thanks!

## 2021-02-03 RX ORDER — NICOTINE 21 MG/24HR
1 PATCH, TRANSDERMAL 24 HOURS TRANSDERMAL DAILY
Qty: 30 PATCH | Refills: 0 | Status: SHIPPED | OUTPATIENT
Start: 2021-02-03 | End: 2021-08-09 | Stop reason: ALTCHOICE

## 2021-02-11 ENCOUNTER — OFFICE VISIT (OUTPATIENT)
Dept: FAMILY MEDICINE CLINIC | Age: 36
End: 2021-02-11
Payer: COMMERCIAL

## 2021-02-11 VITALS
HEART RATE: 96 BPM | RESPIRATION RATE: 14 BRPM | TEMPERATURE: 98.5 F | OXYGEN SATURATION: 99 % | DIASTOLIC BLOOD PRESSURE: 72 MMHG | HEIGHT: 69 IN | SYSTOLIC BLOOD PRESSURE: 120 MMHG | BODY MASS INDEX: 26.07 KG/M2 | WEIGHT: 176 LBS

## 2021-02-11 DIAGNOSIS — I10 ESSENTIAL HYPERTENSION: Primary | ICD-10-CM

## 2021-02-11 DIAGNOSIS — F41.9 ANXIETY: ICD-10-CM

## 2021-02-11 DIAGNOSIS — G43.009 MIGRAINE WITHOUT AURA AND WITHOUT STATUS MIGRAINOSUS, NOT INTRACTABLE: ICD-10-CM

## 2021-02-11 PROCEDURE — 99214 OFFICE O/P EST MOD 30 MIN: CPT | Performed by: NURSE PRACTITIONER

## 2021-02-11 RX ORDER — AMITRIPTYLINE HYDROCHLORIDE 100 MG/1
100 TABLET, FILM COATED ORAL NIGHTLY
Qty: 90 TABLET | Refills: 1 | Status: SHIPPED | OUTPATIENT
Start: 2021-02-11 | End: 2021-08-30 | Stop reason: SDUPTHER

## 2021-02-11 NOTE — PROGRESS NOTES
Awilda Plaza is a 39 y.o. male that presents for Follow-up (nicotine patches are helping some days, depression varies from day to day) and Other (discuss getting a medical marijuana card)      Headache    HPI:    Description of HA - states they are posterior headaches had been doing well but got hit in the head 3 weeks ago with a pipe at work and then the headaches restarted. Symptoms have been present for 3 week(s). Inciting event? - Yes - hit in head with pipe went to hospital and had testing. Severity  6/10  History of migraines? -  Yes  Treatment tried and response - lamictal, elavil, states it had been working well but now not sleeping well. Fever over 100.5 No  Neck stiffness  No  Focal weakness/paresthesias of arm/leg  No  Nausea/vomiting - No  \"Worst headache ever\" - No  Confusion - No  Photophobia/Phonophobia - Yes  Changes in vision - No    Pt had a normal MRI and ct head in January 2021. Anxiety     HPI:    Inciting events or triggers for anxiety - everyday life   Frequency of anxiety - it had been daily but now less  Panic attacks? Yes - but getting better, states since starting the lopressor his anxiety has lessened  Symptoms of panic attacks -  chest pain and racing thoughts  Sleep Disturbances? Yes - states sometimes he sleeps well but other nights does not. Impaired concentration? No  Substance abuse? States he smokes marijuana and it helps his headaches and anxiety, wants the medical marijuana card  Suicidal/Homicidal Ideation? No    Compliant with meds: yes  Med side effects: No    Sees therapist?: No  Family History of Mental Illness? Yes      HTN    Does patient check BP regularly at home? - No  Current Medication regimen - lopressor bid  Tolerating medications well? - yes    Shortness of breath or chest pain? No  Headache or visual complaints? Yes  Neurologic changes like confusion? No  Extremity edema?  No    BP Readings from Last 3 Encounters:   02/11/21 120/72 01/14/21 108/82   02/24/20 98/60     PHYSICAL EXAM:    Vitals:    02/11/21 1627   BP: 120/72   Pulse: 96   Resp: 14   Temp: 98.5 °F (36.9 °C)   SpO2: 99%     GEN:  NAD  HEENT:  NCAT, PERRL, EOMI, Nares clear, turbinates pink, mucosa is moist.  Oropharynx  is clear. Hearing grossly intact. Dentition is normal for age. Neck: No lymphadenopathy or masses. No neck stiffness noted   Heart: RRR. S1 and S2 normal, no murmurs, clicks, gallops or rubs. Lungs:  CTAB,  . Abdomen:  Soft, non tender, non distended. No rebound or guarding. No organomegaly. Extremities:  No gross deformity, erythema or edema of the lower extremities. Skin: No pathologic lesions or significant rash. Neurological:  A&Ox3. CN 2-12 grossly intact. 5/5 strength globally and symmetrically. Cerebellar testing wnl. 2+ patellar reflexes b/l    Pt anxious and jumpy at today's visit. Dominick Willard was seen today for follow-up and other. Diagnoses and all orders for this visit:    Migraine without aura and without status migrainosus, not intractable  -     amitriptyline (ELAVIL) 100 MG tablet; Take 1 tablet by mouth nightly  Increase elavil to 100 mg at night  Continue with imitrex  Anxiety  -     amitriptyline (ELAVIL) 100 MG tablet; Take 1 tablet by mouth nightly    Continue with wellbutrin, lamictal   Pt in agreement with plan     Return in about 3 months (around 5/11/2021) for check up. Patient instructed to go to nearest ED if symptoms worsen or he develops fever, severe neck stiffness, extremity weakness, or change in mental status.

## 2021-02-15 ENCOUNTER — TELEPHONE (OUTPATIENT)
Dept: FAMILY MEDICINE CLINIC | Age: 36
End: 2021-02-15

## 2021-02-15 NOTE — TELEPHONE ENCOUNTER
Patient calling regarding Kareem signing for his medical marijuana card  Patient stated that the dx for anxiety is not passing    Please advise

## 2021-02-15 NOTE — LETTER
[address] [place], [place] [id number] [address] [place] [address]. [place] [address]  Phone: [phone]  Fax: [phone]    ROJELIO Biggs CNP        February 16, 2021     Patient: Feliciano Olivo   YOB: 1985   Date of Visit: 2/15/2021       To Whom It May Concern: It is my medical opinion that Kailyn Swann has been seen in [facility] and is being treated for headaches. Due to this diagnosis the patient is medically acceptable to have a medical marijuana card. .    If you have any questions or concerns, please don't hesitate to call.     Sincerely,        ROJELIO Biggs - YOBANI

## 2021-04-16 ENCOUNTER — TELEPHONE (OUTPATIENT)
Dept: FAMILY MEDICINE CLINIC | Age: 36
End: 2021-04-16

## 2021-04-16 NOTE — TELEPHONE ENCOUNTER
Patient requesting first stage nicotine patch. Patient stated the ones he has now (2nd stage) are not really working because he still has cigarette craves. He tried his wife's (first starter kit) and those seemed to help a lot better than his.

## 2021-04-20 RX ORDER — NICOTINE 21 MG/24HR
1 PATCH, TRANSDERMAL 24 HOURS TRANSDERMAL DAILY
Qty: 42 PATCH | Refills: 0 | Status: SHIPPED | OUTPATIENT
Start: 2021-04-20 | End: 2021-08-09 | Stop reason: ALTCHOICE

## 2021-05-20 ENCOUNTER — OFFICE VISIT (OUTPATIENT)
Dept: FAMILY MEDICINE CLINIC | Age: 36
End: 2021-05-20
Payer: COMMERCIAL

## 2021-05-20 VITALS
BODY MASS INDEX: 25.48 KG/M2 | HEIGHT: 69 IN | TEMPERATURE: 98.5 F | HEART RATE: 88 BPM | WEIGHT: 172 LBS | RESPIRATION RATE: 12 BRPM | DIASTOLIC BLOOD PRESSURE: 66 MMHG | SYSTOLIC BLOOD PRESSURE: 102 MMHG | OXYGEN SATURATION: 99 %

## 2021-05-20 DIAGNOSIS — F41.9 ANXIETY: ICD-10-CM

## 2021-05-20 DIAGNOSIS — I10 ESSENTIAL HYPERTENSION: ICD-10-CM

## 2021-05-20 DIAGNOSIS — R00.0 SINUS TACHYCARDIA SEEN ON CARDIAC MONITOR: ICD-10-CM

## 2021-05-20 DIAGNOSIS — Z72.0 TOBACCO ABUSE: ICD-10-CM

## 2021-05-20 PROCEDURE — 99214 OFFICE O/P EST MOD 30 MIN: CPT | Performed by: NURSE PRACTITIONER

## 2021-05-20 SDOH — ECONOMIC STABILITY: TRANSPORTATION INSECURITY
IN THE PAST 12 MONTHS, HAS LACK OF TRANSPORTATION KEPT YOU FROM MEETINGS, WORK, OR FROM GETTING THINGS NEEDED FOR DAILY LIVING?: NO

## 2021-05-20 SDOH — ECONOMIC STABILITY: FOOD INSECURITY: WITHIN THE PAST 12 MONTHS, THE FOOD YOU BOUGHT JUST DIDN'T LAST AND YOU DIDN'T HAVE MONEY TO GET MORE.: NEVER TRUE

## 2021-05-20 SDOH — ECONOMIC STABILITY: HOUSING INSECURITY
IN THE LAST 12 MONTHS, WAS THERE A TIME WHEN YOU DID NOT HAVE A STEADY PLACE TO SLEEP OR SLEPT IN A SHELTER (INCLUDING NOW)?: NO

## 2021-05-20 NOTE — PROGRESS NOTES
Linda Leiva is a 39 y.o. male that presents for Follow-up (going  good - no concerns )      Anxiety     HPI:    Inciting events or triggers for anxiety - everyday life events    Frequency of anxiety - it had been daily but he states with the wellbutrin and lamictal his anxiety has decreased a lot, he feels more calm is able to focus better during the day, not having panic attacks sleeping better   Panic attacks? Yes - in the past but none in the last month   Symptoms of panic attacks -  chest pain, difficulty concentrating, shortness of breath and sweating  Sleep Disturbances? No he is sleeping better with the elavil at night feels more calm mind not racing   Impaired concentration? No  Substance abuse? No  Suicidal/Homicidal Ideation? No    Compliant with meds: yes  Med side effects: No    Sees therapist?: No  Family History of Mental Illness? Yes    He had had an elevated heart rate in the past, it is controlled with the metoprolol, hear  Rate in normal range today denies chest pain or heart palpitations. He continues to use smoking patches they do work but he is still smoking but less. Physical Exam    /66   Pulse 88   Temp 98.5 °F (36.9 °C) (Oral)   Resp 12   Ht 5' 9\" (1.753 m)   Wt 172 lb (78 kg)   SpO2 99%   BMI 25.40 kg/m²   Appearance: alert, well appearing, and in no distress, normal appearing weight and well hydrated. CVS exam: normal rate, regular rhythm, normal S1, S2, no murmurs, rubs, clicks or gallops. Lungs: clear to auscultation, no wheezes, rales or rhonchi, symmetric air entry. Skin exam - normal coloration and turgor, no rashes, no suspicious skin lesions noted. Psych:  Affect appropriate. Thought process is normal without evidence of depression or psychosis. Good insight and appropriae interaction. Cognition and memory appear to be intact. Pt appears very calm today during visit   Jazmine Hidalgo was seen today for follow-up.     Diagnoses and all orders for this visit:    Anxiety  Improved with current meds  Continue same dosages   Sinus tachycardia seen on cardiac monitor  -     metoprolol tartrate (LOPRESSOR) 25 MG tablet; Take 1 tablet by mouth 2 times daily  Improved with meds  Tobacco abuse  Continue with smoking patches   Essential hypertension  Controlled  Continue current meds    Pt in agreement with plan       Return in about 6 months (around 11/20/2021) for check up physical and med refill .

## 2021-05-24 ENCOUNTER — OFFICE VISIT (OUTPATIENT)
Dept: FAMILY MEDICINE CLINIC | Age: 36
End: 2021-05-24
Payer: COMMERCIAL

## 2021-05-24 VITALS
HEIGHT: 68 IN | WEIGHT: 164 LBS | HEART RATE: 85 BPM | TEMPERATURE: 98.1 F | BODY MASS INDEX: 24.86 KG/M2 | DIASTOLIC BLOOD PRESSURE: 70 MMHG | OXYGEN SATURATION: 97 % | RESPIRATION RATE: 10 BRPM | SYSTOLIC BLOOD PRESSURE: 130 MMHG

## 2021-05-24 DIAGNOSIS — M54.50 LUMBAR PAIN: Primary | ICD-10-CM

## 2021-05-24 DIAGNOSIS — M62.838 MUSCLE SPASM: ICD-10-CM

## 2021-05-24 PROCEDURE — 96372 THER/PROPH/DIAG INJ SC/IM: CPT | Performed by: NURSE PRACTITIONER

## 2021-05-24 PROCEDURE — 99214 OFFICE O/P EST MOD 30 MIN: CPT | Performed by: NURSE PRACTITIONER

## 2021-05-24 RX ORDER — TIZANIDINE 2 MG/1
2 TABLET ORAL NIGHTLY PRN
Qty: 10 TABLET | Refills: 0 | Status: SHIPPED | OUTPATIENT
Start: 2021-05-24 | End: 2021-08-09 | Stop reason: ALTCHOICE

## 2021-05-24 RX ORDER — KETOROLAC TROMETHAMINE 30 MG/ML
30 INJECTION, SOLUTION INTRAMUSCULAR; INTRAVENOUS ONCE
Status: COMPLETED | OUTPATIENT
Start: 2021-05-24 | End: 2021-05-24

## 2021-05-24 RX ORDER — METHYLPREDNISOLONE ACETATE 40 MG/ML
80 INJECTION, SUSPENSION INTRA-ARTICULAR; INTRALESIONAL; INTRAMUSCULAR; SOFT TISSUE ONCE
Status: COMPLETED | OUTPATIENT
Start: 2021-05-24 | End: 2021-05-24

## 2021-05-24 RX ADMIN — KETOROLAC TROMETHAMINE 30 MG: 30 INJECTION, SOLUTION INTRAMUSCULAR; INTRAVENOUS at 12:21

## 2021-05-24 RX ADMIN — METHYLPREDNISOLONE ACETATE 80 MG: 40 INJECTION, SUSPENSION INTRA-ARTICULAR; INTRALESIONAL; INTRAMUSCULAR; SOFT TISSUE at 12:21

## 2021-05-24 NOTE — PROGRESS NOTES
topically 2 times daily  Warm moist heat  Avoid lifting over 10 pounds  Work letter given  Pt in agreement with plan  If symptoms worsen contact the office. Muscle spasm  -     tiZANidine (ZANAFLEX) 2 MG tablet; Take 1 tablet by mouth nightly as needed (prn muscle spasm)  -     diclofenac sodium (VOLTAREN) 1 % GEL; Apply 2 g topically 2 times daily    Other orders  -     ketorolac (TORADOL) injection 30 mg  -     methylPREDNISolone acetate (DEPO-MEDROL) injection 80 mg        Return if symptoms worsen or fail to improve. Matthew Dc received counseling on the following healthy behaviors: medication adherence  Reviewed prior labs and health maintenance. Continue current medications, diet and exercise. Discussed use, benefit, and side effects of prescribed medications. Barriers to medication compliance addressed. Patient given educational materials - see patient instructions. All patient questions answered. Patient voiced understanding.

## 2021-05-24 NOTE — LETTER
[address] [place] [address]  Phone: [phone]  Fax: [phone]    ROJELIO Forrester CNP        May 24, 2021     Patient: Jaime Ruiz   YOB: 1985   Date of Visit: 5/24/2021       To Whom It May Concern: It is my medical opinion that [employer]  needs to be excused from work today, Monday May 24th, 2021. .    If you have any questions or concerns, please don't hesitate to call.     Sincerely,        ROJELIO Forrester CNP

## 2021-05-24 NOTE — LETTER
[address] [place] [address]  [address] [place] [address]. [facility] [address]  Phone: [phone]  Fax: [phone]    ROJELIO Hernandez CNP        May 24, 2021     Patient: Kika Ricketts   YOB: 1985   Date of Visit: 5/24/2021       To Whom It May Concern: It is my medical opinion that Serenity Castro patient may return to workon Tuesday may 25th, [address]. patoent does have a lift restriciton of 10 pounds through Friday May 28th, 2021. .    If you have any questions or concerns, please don't hesitate to call.     Sincerely,        ROJELIO Hernandez CNP

## 2021-05-25 ENCOUNTER — TELEPHONE (OUTPATIENT)
Dept: FAMILY MEDICINE CLINIC | Age: 36
End: 2021-05-25

## 2021-05-25 NOTE — TELEPHONE ENCOUNTER
----- Message from Chapin Morales sent at 5/25/2021 11:40 AM EDT -----  Subject: Message to Provider    QUESTIONS  Information for Provider? Patient called in and stated had a appointment   on 5/24/21 and stated provider gave a note to the job excuse work   yesterday but also put pt on light work duty and stated woke up with bad   back issues and did not go to work and want to know if provider could   write a another note excuse work today   ---------------------------------------------------------------------------  --------------  [address]  What is the best way for the office to contact you? OK to leave message on   voicemail  Preferred Call Back Phone Number? [phone]  ---------------------------------------------------------------------------  --------------  SCRIPT ANSWERS  Relationship to Patient?  Self

## 2021-05-25 NOTE — LETTER
[address] [facility]  [address] [facility] [facility] [address]  Phone: [phone]  Fax: [phone]    Meghan SERRATO CNP        May 25, 2021     Patient: Tim Gomes   YOB: 1985   Date of Visit: 5/25/2021       To Whom It May Concern: It is my medical opinion that Silvia Bello please excuse patient from work today Tuesday May 25th, 2021. .    If you have any questions or concerns, please don't hesitate to call.     Sincerely,        Meghan SERRATO CNP

## 2021-05-25 NOTE — TELEPHONE ENCOUNTER
QUESTIONS   Patient called in and stated had a appointment   on 5/24/21 and stated provider gave a note to the job excuse work   yesterday but also put pt on light work duty and stated woke up with bad back issues and did not go to work and want to know if provider could write a another note excuse work today.  Please advise

## 2021-08-09 ENCOUNTER — OFFICE VISIT (OUTPATIENT)
Dept: FAMILY MEDICINE CLINIC | Age: 36
End: 2021-08-09
Payer: COMMERCIAL

## 2021-08-09 ENCOUNTER — NURSE TRIAGE (OUTPATIENT)
Dept: OTHER | Facility: CLINIC | Age: 36
End: 2021-08-09

## 2021-08-09 VITALS
HEIGHT: 69 IN | DIASTOLIC BLOOD PRESSURE: 70 MMHG | OXYGEN SATURATION: 99 % | WEIGHT: 159.5 LBS | SYSTOLIC BLOOD PRESSURE: 126 MMHG | BODY MASS INDEX: 23.62 KG/M2 | RESPIRATION RATE: 12 BRPM | HEART RATE: 86 BPM | TEMPERATURE: 98 F

## 2021-08-09 DIAGNOSIS — K29.00 ACUTE GASTRITIS WITHOUT HEMORRHAGE, UNSPECIFIED GASTRITIS TYPE: ICD-10-CM

## 2021-08-09 DIAGNOSIS — K21.9 GASTROESOPHAGEAL REFLUX DISEASE, UNSPECIFIED WHETHER ESOPHAGITIS PRESENT: ICD-10-CM

## 2021-08-09 DIAGNOSIS — R11.0 NAUSEA: Primary | ICD-10-CM

## 2021-08-09 LAB
BILIRUBIN URINE: ABNORMAL
BLOOD URINE, POC: NEGATIVE
CHARACTER, URINE: CLEAR
COLOR, URINE: ABNORMAL
GLUCOSE URINE: NEGATIVE MG/DL
KETONES, URINE: ABNORMAL
LEUKOCYTE CLUMPS, URINE: ABNORMAL
NITRITE, URINE: NEGATIVE
PH, URINE: 6 (ref 5–9)
PROTEIN, URINE: 30 MG/DL
SPECIFIC GRAVITY, URINE: >= 1.03 (ref 1–1.03)
UROBILINOGEN, URINE: 0.2 EU/DL (ref 0–1)

## 2021-08-09 PROCEDURE — 99214 OFFICE O/P EST MOD 30 MIN: CPT | Performed by: NURSE PRACTITIONER

## 2021-08-09 RX ORDER — OMEPRAZOLE 20 MG/1
20 CAPSULE, DELAYED RELEASE ORAL DAILY
Qty: 30 CAPSULE | Refills: 3 | Status: SHIPPED | OUTPATIENT
Start: 2021-08-09 | End: 2021-11-22 | Stop reason: SDUPTHER

## 2021-08-09 RX ORDER — ONDANSETRON 4 MG/1
4 TABLET, FILM COATED ORAL 3 TIMES DAILY PRN
Qty: 30 TABLET | Refills: 0 | Status: SHIPPED | OUTPATIENT
Start: 2021-08-09

## 2021-08-09 ASSESSMENT — ENCOUNTER SYMPTOMS
DIARRHEA: 0
RECTAL PAIN: 0
BACK PAIN: 0
NAUSEA: 1
ABDOMINAL PAIN: 0
RESPIRATORY NEGATIVE: 1
CONSTIPATION: 1
VOMITING: 0
ABDOMINAL DISTENTION: 0

## 2021-08-09 NOTE — PROGRESS NOTES
[address] ANN Dodd [address]  Dept: [phone]  Dept Fax: [phone]  Loc: [phone]    Laura Grace is a 39 y.o. Audra Del Cid presents today for his medical conditions/complaints as noted below. Harry Garza Dillon c/o of Nausea (x3 days), Anorexia (x3 days), and Excessive Sweating (x3 days)      HPI:      Pt presents with a 3 day history of nausea and gerd and foaming at mouth with excess saliva and waking up at night sweating. Has been taking a lot of tums without relief. States this happened one other time to him after drinking alcohol. Denies a fever, chills or sweats, denies drinking alcohol. Denies abdominal pain no urintation problems has been a little constipated. Taking all other meds as ordered.           Current Outpatient Medications   Medication Sig Dispense Refill    omeprazole (PRILOSEC) 20 MG delayed release capsule Take 1 capsule by mouth daily 30 capsule 3    ondansetron (ZOFRAN) 4 MG tablet Take 1 tablet by mouth 3 times daily as needed for Nausea or Vomiting 30 tablet 0    diclofenac sodium (VOLTAREN) 1 % GEL Apply 2 g topically 2 times daily 100 g 1    metoprolol tartrate (LOPRESSOR) 25 MG tablet Take 1 tablet by mouth 2 times daily 180 tablet 4    amitriptyline (ELAVIL) 100 MG tablet Take 1 tablet by mouth nightly 90 tablet 1    buPROPion (WELLBUTRIN XL) 150 MG extended release tablet Take 1 tablet by mouth every morning 90 tablet 3    lamoTRIgine (LAMICTAL) 25 MG tablet Take 2 tablets by mouth daily 120 tablet 3    SUMAtriptan (IMITREX) 25 MG tablet Take 1 tablet by mouth once as needed for Migraine 9 tablet 5    acetaminophen (APAP EXTRA STRENGTH) 500 MG tablet Take 2 tablets by mouth every 8 hours as needed for Pain 180 tablet 5    albuterol sulfate HFA (PROVENTIL HFA) 108 (90 Base) MCG/ACT inhaler Inhale 2 puffs into the lungs every 6 hours as needed for Wheezing (Patient not taking: Reported on 8/9/2021) 1 Inhaler 3    ibuprofen (ADVIL;MOTRIN) 600 MG tablet Take 1 tablet by mouth every 8 hours as needed for Pain (Patient not taking: Reported on 8/9/2021) 90 tablet 5     No current facility-administered medications for this visit. Past Medical History:   Diagnosis Date    Anxiety     Asthma     when he was younger he was told he had asthma     Concussion 01/16/2019    Depression     Migraine     Obstructive sleep apnea 9/29/2014    Tobacco abuse       Past Surgical History:   Procedure Laterality Date    VASECTOMY  12/2018     Family History   Problem Relation Age of Onset    Other Mother         hepatitis     Diabetes Mother     High Blood Pressure Mother     Depression Mother     Anxiety Disorder Mother     Cancer Maternal Grandmother     Cancer Maternal Grandfather      Social History     Tobacco Use    Smoking status: Current Some Day Smoker     Packs/day: 0.03     Types: Cigarettes    Smokeless tobacco: Never Used   Substance Use Topics    Alcohol use: Yes     Comment: rare        Allergies   Allergen Reactions    Pcn [Penicillins] Rash       Health Maintenance   Topic Date Due    Pneumococcal 0-64 years Vaccine (1 of 2 - PPSV23) Never done    COVID-19 Vaccine (1) Never done    Flu vaccine (1) 09/01/2021    DTaP/Tdap/Td vaccine (2 - Td or Tdap) 08/26/2023    Hepatitis A vaccine  Aged Out    Hepatitis B vaccine  Aged Out    Hib vaccine  Aged Out    Meningococcal (ACWY) vaccine  Aged Out    Varicella vaccine  Discontinued    Hepatitis C screen  Discontinued    HIV screen  Discontinued       Subjective:      Review of Systems   Constitutional: Negative for chills, fatigue and fever. Respiratory: Negative. Cardiovascular: Negative. Gastrointestinal: Positive for constipation and nausea. Negative for abdominal distention, abdominal pain, diarrhea, rectal pain and vomiting.    Genitourinary: Negative for difficulty urinating, discharge, dysuria, flank pain, frequency and hematuria. Musculoskeletal: Negative for arthralgias, back pain and myalgias. Skin: Negative. Neurological: Negative for dizziness, facial asymmetry, weakness and headaches. Psychiatric/Behavioral: Negative for self-injury, sleep disturbance and suicidal ideas. Objective:      /70   Pulse 86   Temp 98 °F (36.7 °C) (Oral)   Resp 12   Ht 5' 9\" (1.753 m)   Wt 159 lb 8 oz (72.3 kg)   SpO2 99%   BMI 23.55 kg/m²      Physical Exam  Vitals and nursing note reviewed. Constitutional:       Appearance: He is not ill-appearing. Cardiovascular:      Rate and Rhythm: Normal rate and regular rhythm. Pulses: Normal pulses. Heart sounds: Normal heart sounds. No murmur heard. Pulmonary:      Effort: Pulmonary effort is normal. No respiratory distress. Breath sounds: Normal breath sounds. No wheezing. Abdominal:      General: Abdomen is flat. Bowel sounds are increased. Palpations: Abdomen is soft. Tenderness: There is no abdominal tenderness. There is no right CVA tenderness, left CVA tenderness or guarding. Musculoskeletal:         General: Normal range of motion. Skin:     General: Skin is warm and dry. Capillary Refill: Capillary refill takes less than 2 seconds. Neurological:      General: No focal deficit present. Mental Status: He is alert. Assessment/Plan:           1. Nausea  Gastritis  Avoid gerd foods or spicy foods  Start zofran  If symptoms worsen contact office     2. Acute gastritis without hemorrhage, unspecified gastritis type    As above  Start prilosec   GERd diet   3. Gastroesophageal reflux disease, unspecified whether esophagitis present  As above  Pt in agreement with plan     ER precautions given to pt. Return in about 3 weeks (around 8/30/2021) for f/u new meds.     Reccommended tobaccocessation options including pharmacologic methods, counseled great than 3 minutesduring this visit:  Martha Campos No  []       Patient given educational materials -see patient instructions. Discussed use, benefit, and side effects of prescribedmedications. All patient questions answered. Pt voiced understanding. Reviewedhealth maintenance. Instructed to continue current medications, diet and exercise. Patient agreed with treatment plan. Follow up as directed.        Electronicallysigned by ROJELIO Johns CNP on 8/9/2021 at 3:36 PM

## 2021-08-09 NOTE — TELEPHONE ENCOUNTER
Received call from Juana Casanova at [facility] with [employer] [facility]. Brief description of triage: Pt with nausea for 3 days. Is not able to eat much but is still drinking. No vomiting, no dizziness. Concerned about reflux. Triage indicates for patient to see today or tomorrow. Care advice provided, patient verbalizes understanding; denies any other questions or concerns; instructed to call back for any new or worsening symptoms. Writer provided warm transfer to [facility] at [facility] for appointment scheduling. Attention Provider: Thank you for allowing me to participate in the care of your patient. The patient was connected to triage in response to information provided to the [facility]. Please do not respond through this encounter as the response is not directed to a shared pool. Reason for Disposition   Patient wants to be seen    Answer Assessment - Initial Assessment Questions  1. NAUSEA SEVERITY: \"How bad is the nausea? \" (e.g., mild, moderate, severe; dehydration, weight loss)    - MILD: loss of appetite without change in eating habits    - MODERATE: decreased oral intake without significant weight loss, dehydration, or malnutrition    - SEVERE: inadequate caloric or fluid intake, significant weight loss, symptoms of dehydration      Tried to eat yesterday, drinking fluids  No dizziness  No abdominal pain    2. ONSET: \"When did the nausea begin? \"  Started 3 days ago    3. VOMITING: \"Any vomiting? \" If so, ask: \"How many times today? \"     No vomiting    4. RECURRENT SYMPTOM: \"Have you had nausea before? \" If so, ask: \"When was the last time? \" \"What happened that time? \"   had acid reflux in the past    5. CAUSE: \"What do you think is causing the nausea? \"      Concerned it could be acid reflux    6. PREGNANCY: \"Is there any chance you are pregnant? \" (e.g., unprotected intercourse, missed birth control pill, broken condom)   na    Protocols used: NAUSEA-ADULT-OH

## 2021-08-30 ENCOUNTER — OFFICE VISIT (OUTPATIENT)
Dept: FAMILY MEDICINE CLINIC | Age: 36
End: 2021-08-30
Payer: COMMERCIAL

## 2021-08-30 VITALS
RESPIRATION RATE: 12 BRPM | DIASTOLIC BLOOD PRESSURE: 68 MMHG | WEIGHT: 168 LBS | HEIGHT: 69 IN | OXYGEN SATURATION: 98 % | BODY MASS INDEX: 24.88 KG/M2 | TEMPERATURE: 98.5 F | HEART RATE: 82 BPM | SYSTOLIC BLOOD PRESSURE: 112 MMHG

## 2021-08-30 DIAGNOSIS — F41.9 ANXIETY: ICD-10-CM

## 2021-08-30 DIAGNOSIS — K21.9 GASTROESOPHAGEAL REFLUX DISEASE, UNSPECIFIED WHETHER ESOPHAGITIS PRESENT: Primary | ICD-10-CM

## 2021-08-30 DIAGNOSIS — G43.009 MIGRAINE WITHOUT AURA AND WITHOUT STATUS MIGRAINOSUS, NOT INTRACTABLE: ICD-10-CM

## 2021-08-30 PROCEDURE — 99214 OFFICE O/P EST MOD 30 MIN: CPT | Performed by: NURSE PRACTITIONER

## 2021-08-30 RX ORDER — AMITRIPTYLINE HYDROCHLORIDE 100 MG/1
100 TABLET, FILM COATED ORAL NIGHTLY
Qty: 90 TABLET | Refills: 3 | Status: SHIPPED | OUTPATIENT
Start: 2021-08-30 | End: 2022-06-07 | Stop reason: SDUPTHER

## 2021-08-30 NOTE — PROGRESS NOTES
Herminio Prince is a 39 y.o. male that presents for Follow-up (pt states medication is working well for him )      Headache    HPI:    Symptoms have been present for 1 year(s). Description of HA - squeezing pain    Inciting event? - No  Severity - 6/10  History of migraines? -  Yes  Treatment tried and response - states since being on the elavil 100 mg his headaches have decreased in number and intensity he is doing well with this     Fever over 100.5- No  Neck stiffness - No  Focal weakness/paresthesias of arm/leg - No  Nausea/vomiting - No  \"Worst headache ever\" - No  Confusion - No  Photophobia/Phonophobia - No  Changes in vision - No  Nausea/vomiting - no  GERD    HPI:     Symptoms:  heartburn  Length of symptoms: 1 months  Current therapy and response:  prilosec 20 mg daily it is working well for him, states his nausea and gastritis symptoms have resolved. Recent change in symptoms? Yes - improved   Symptoms associated with certain foods? Yes  Alcohol use? No  Tobacco use? Yes    Abdominal Pain? No  Mid Back Pain? No  Melena? No [facility]    PHYSICAL EXAM:    Vitals:    08/30/21 1624   BP: 112/68   Pulse: 82   Resp: 12   Temp: 98.5 °F (36.9 °C)   SpO2: 98%     GEN:  NAD  HEENT:  NCAT, PERRL, EOMI, Nares clear, turbinates pink, mucosa is moist.  Oropharynx  is clear. Hearing grossly intact. Dentition is normal for age. Neck: No lymphadenopathy or masses. No neck stiffness noted   Heart: RRR. S1 and S2 normal, no murmurs, clicks, gallops or rubs. Lungs:  CTAB,  . Abdomen:  Soft, non tender, non distended. No rebound or guarding. No organomegaly. Extremities:  No gross deformity, erythema or edema of the lower extremities. Skin: No pathologic lesions or significant rash. Neurological:  A&Ox3. CN 2-12 grossly intact. 5/5 strength globally and symmetrically. Cerebellar testing wnl. Sasha Lieberman was seen today for follow-up.     Diagnoses and all orders for this visit:    Gastroesophageal reflux disease, unspecified whether esophagitis present  Improved  GERD diet  Continue prilsoec for at least 3 months may stop at that time and see how he does, if symptoms return restart prilsoec  Migraine without aura and without status migrainosus, not intractable  -     amitriptyline (ELAVIL) 100 MG tablet; Take 1 tablet by mouth nightly  controlled  Anxiety  -     amitriptyline (ELAVIL) 100 MG tablet; Take 1 tablet by mouth nightly    Controlled with current meds    Return if symptoms worsen or fail to improve.    -Start above treatments  -Patient advised to contact our office immediately if symptoms worsen or persist  -Patient counseled on conservative care including fluids, rest and OTC meds  -Patient instructed to go to nearest ED if symptoms worsen or he develops fever, severe neck stiffness, extremity weakness, or change in mental status.

## 2021-09-06 DIAGNOSIS — F41.9 ANXIETY: ICD-10-CM

## 2021-09-06 DIAGNOSIS — R45.86 MOOD SWING: ICD-10-CM

## 2021-09-06 DIAGNOSIS — R00.0 SINUS TACHYCARDIA SEEN ON CARDIAC MONITOR: ICD-10-CM

## 2021-09-07 RX ORDER — LAMOTRIGINE 25 MG/1
50 TABLET ORAL DAILY
Qty: 120 TABLET | Refills: 3 | Status: SHIPPED | OUTPATIENT
Start: 2021-09-07 | End: 2021-11-22 | Stop reason: SDUPTHER

## 2021-11-22 ENCOUNTER — OFFICE VISIT (OUTPATIENT)
Dept: FAMILY MEDICINE CLINIC | Age: 36
End: 2021-11-22
Payer: COMMERCIAL

## 2021-11-22 VITALS
DIASTOLIC BLOOD PRESSURE: 86 MMHG | HEIGHT: 69 IN | HEART RATE: 101 BPM | SYSTOLIC BLOOD PRESSURE: 118 MMHG | TEMPERATURE: 98.5 F | BODY MASS INDEX: 26.22 KG/M2 | WEIGHT: 177 LBS | RESPIRATION RATE: 16 BRPM

## 2021-11-22 DIAGNOSIS — Z87.891 PERSONAL HISTORY OF TOBACCO USE, PRESENTING HAZARDS TO HEALTH: ICD-10-CM

## 2021-11-22 DIAGNOSIS — F41.9 ANXIETY: ICD-10-CM

## 2021-11-22 DIAGNOSIS — J45.40 MODERATE PERSISTENT ASTHMA WITHOUT COMPLICATION: ICD-10-CM

## 2021-11-22 DIAGNOSIS — K21.9 GASTROESOPHAGEAL REFLUX DISEASE, UNSPECIFIED WHETHER ESOPHAGITIS PRESENT: ICD-10-CM

## 2021-11-22 DIAGNOSIS — Z79.899 MEDICAL MARIJUANA USE: ICD-10-CM

## 2021-11-22 DIAGNOSIS — Z00.00 ENCOUNTER FOR WELL ADULT EXAM WITHOUT ABNORMAL FINDINGS: Primary | ICD-10-CM

## 2021-11-22 DIAGNOSIS — Z23 IMMUNIZATION DUE: ICD-10-CM

## 2021-11-22 DIAGNOSIS — R45.86 MOOD SWING: ICD-10-CM

## 2021-11-22 PROCEDURE — 99213 OFFICE O/P EST LOW 20 MIN: CPT | Performed by: NURSE PRACTITIONER

## 2021-11-22 PROCEDURE — 90674 CCIIV4 VAC NO PRSV 0.5 ML IM: CPT | Performed by: NURSE PRACTITIONER

## 2021-11-22 PROCEDURE — 99395 PREV VISIT EST AGE 18-39: CPT | Performed by: NURSE PRACTITIONER

## 2021-11-22 PROCEDURE — 99407 BEHAV CHNG SMOKING > 10 MIN: CPT | Performed by: NURSE PRACTITIONER

## 2021-11-22 PROCEDURE — 1000F TOBACCO USE ASSESSED: CPT | Performed by: NURSE PRACTITIONER

## 2021-11-22 PROCEDURE — 90471 IMMUNIZATION ADMIN: CPT | Performed by: NURSE PRACTITIONER

## 2021-11-22 RX ORDER — NICOTINE 21 MG/24HR
1 PATCH, TRANSDERMAL 24 HOURS TRANSDERMAL EVERY 24 HOURS
Qty: 30 PATCH | Refills: 0 | Status: SHIPPED | OUTPATIENT
Start: 2021-11-22 | End: 2022-11-22

## 2021-11-22 RX ORDER — OMEPRAZOLE 20 MG/1
20 CAPSULE, DELAYED RELEASE ORAL DAILY
Qty: 90 CAPSULE | Refills: 3 | Status: SHIPPED | OUTPATIENT
Start: 2021-11-22

## 2021-11-22 RX ORDER — LAMOTRIGINE 25 MG/1
25 TABLET ORAL DAILY
Qty: 90 TABLET | Refills: 1 | Status: SHIPPED | OUTPATIENT
Start: 2021-11-22 | End: 2022-05-09

## 2021-11-22 ASSESSMENT — ENCOUNTER SYMPTOMS
RESPIRATORY NEGATIVE: 1
GASTROINTESTINAL NEGATIVE: 1

## 2021-11-22 NOTE — PROGRESS NOTES
Vaccine Information Sheet, \"Influenza - Inactivated\"  given to Jennifer Quinones, or parent/legal guardian of  Jennifer Quinones and verbalized understanding. Patient responses:    Have you ever had a reaction to a flu vaccine? No  Do you have an allergy to eggs, neomycin or polymixin? No  Do you have an allergy to Thimerosal, contact lens solution, or Merthiolate? No  Have you ever had Gu[place] Syndrome? No  Do you have any current illness? No  Do you have a temperature above 100 degrees? No  Are you pregnant? No  If pregnant, permission obtained from physician? No  Do you have an active neurological disorder? No      Flu vaccine given per order. Please see immunization tab.   Immunization History   Administered Date(s) Administered    Influenza A ([id number]) Vaccine PF IM 02/24/2010    Influenza Virus Vaccine 10/28/2020    Influenza, MDCK Quadv, IM, PF (Flucelvax 2 yrs and older) 11/22/2021    Influenza, Quadv, IM, PF (6 mo and older Fluzone, Flulaval, Fluarix, and 3 yrs and older Afluria) 10/21/2019    Tdap (Boostrix, Adacel) 08/26/2013

## 2021-11-22 NOTE — PATIENT INSTRUCTIONS
Stopping Smoking: Care Instructions  Your Care Instructions     Cigarette smokers crave the nicotine in cigarettes. Giving it up is much harder than simply changing a habit. Your body has to stop craving the nicotine. It is hard to quit, but you can do it. There are many tools that people use to quit smoking. You may find that combining tools works best for you. There are several steps to quitting. First you get ready to quit. Then you get support to help you. After that, you learn new skills and behaviors to become a nonsmoker. For many people, a necessary step is getting and using medicine. Your doctor will help you set up the plan that best meets your needs. You may want to attend a smoking cessation program to help you quit smoking. When you choose a program, look for one that has proven success. Ask your doctor for ideas. You will greatly increase your chances of success if you take medicine as well as get counseling or join a cessation program.  Some of the changes you feel when you first quit tobacco are uncomfortable. Your body will miss the nicotine at first, and you may feel short-tempered and grumpy. You may have trouble sleeping or concentrating. Medicine can help you deal with these symptoms. You may struggle with changing your smoking habits and rituals. The last step is the tricky one: Be prepared for the smoking urge to continue for a time. This is a lot to deal with, but keep at it. You will feel better. Follow-up care is a key part of your treatment and safety. Be sure to make and go to all appointments, and call your doctor if you are having problems. It's also a good idea to know your test results and keep a list of the medicines you take. How can you care for yourself at home? · Ask your family, friends, and coworkers for support. You have a better chance of quitting if you have help and support.   · Join a support group, such as Nicotine Anonymous, for people who are trying to quit smoking. · Consider signing up for a smoking cessation program, such as the American Lung Association's Freedom from Smoking program.  · Get text messaging support. Go to the website at www.smokefree. gov to sign up for the [facility] program.  · Set a quit date. Pick your date carefully so that it is not right in the middle of a big deadline or stressful time. Once you quit, do not even take a puff. Get rid of all ashtrays and lighters after your last cigarette. Clean your house and your clothes so that they do not smell of smoke. · Learn how to be a nonsmoker. Think about ways you can avoid those things that make you reach for a cigarette. ? Avoid situations that put you at greatest risk for smoking. For some people, it is hard to have a drink with friends without smoking. For others, they might skip a coffee break with coworkers who smoke. ? Change your daily routine. Take a different route to work or eat a meal in a different place. · Cut down on stress. Calm yourself or release tension by doing an activity you enjoy, such as reading a book, taking a hot bath, or gardening. · Talk to your doctor or pharmacist about nicotine replacement therapy, which replaces the nicotine in your body. You still get nicotine but you do not use tobacco. Nicotine replacement products help you slowly reduce the amount of nicotine you need. These products come in several forms, many of them available over-the-counter:  ? Nicotine patches  ? Nicotine gum and lozenges  ? Nicotine inhaler  · Ask your doctor about bupropion (Wellbutrin) or varenicline (Chantix), which are prescription medicines. They do not contain nicotine. They help you by reducing withdrawal symptoms, such as stress and anxiety. · Some people find hypnosis, acupuncture, and massage helpful for ending the smoking habit. · Eat a healthy diet and get regular exercise. Having healthy habits will help your body move past its craving for nicotine.   · Be prepared to keep trying. Most people are not successful the first few times they try to quit. Do not get mad at yourself if you smoke again. Make a list of things you learned and think about when you want to try again, such as next week, next month, or next year. Where can you learn more? Go to https://chpelili.healthSkuRun. org and sign in to your [employer] account. Enter Y479 in the [place] [employer] box to learn more about \"Stopping Smoking: Care Instructions. \"     If you do not have an account, please click on the \"Sign Up Now\" link. Current as of: February 11, 2021               Content Version: 13.0  © 2006-2021 Healthwise, [employer]. Care instructions adapted under license by [facility] ([facility]). If you have questions about a medical condition or this instruction, always ask your healthcare professional. [place] [address] any warranty or liability for your use of this information. Well Visit, Ages 25 to 48: Care Instructions  Overview     Well visits can help you stay healthy. Your doctor has checked your overall health and may have suggested ways to take good care of yourself. Your doctor also may have recommended tests. At home, you can help prevent illness with healthy eating, regular exercise, and other steps. Follow-up care is a key part of your treatment and safety. Be sure to make and go to all appointments, and call your doctor if you are having problems. It's also a good idea to know your test results and keep a list of the medicines you take. How can you care for yourself at home? · Get screening tests that you and your doctor decide on. Screening helps find diseases before any symptoms appear. · Eat healthy foods. Choose fruits, vegetables, whole grains, protein, and low-fat dairy foods. Limit fat, especially saturated fat. Reduce salt in your diet. · Limit alcohol. If you are a man, have no more than 2 drinks a day or 14 drinks a week.  If you are a woman, have no more than 1 drink a day or 7 drinks a week. · Get at least 30 minutes of physical activity on most days of the week. Walking is a good choice. You also may want to do other activities, such as running, swimming, cycling, or playing tennis or team sports. Discuss any changes in your exercise program with your doctor. · Reach and stay at a healthy weight. This will lower your risk for many problems, such as obesity, diabetes, heart disease, and high blood pressure. · Do not smoke or allow others to smoke around you. If you need help quitting, talk to your doctor about stop-smoking programs and medicines. These can increase your chances of quitting for good. · Care for your mental health. It is easy to get weighed down by worry and stress. Learn strategies to manage stress, like deep breathing and mindfulness, and stay connected with your family and community. If you find you often feel sad or hopeless, talk with your doctor. Treatment can help. · Talk to your doctor about whether you have any risk factors for sexually transmitted infections (STIs). You can help prevent STIs if you wait to have sex with a new partner (or partners) until you've each been tested for STIs. It also helps if you use condoms (male or female condoms) and if you limit your sex partners to one person who only has sex with you. Vaccines are available for some STIs, such as HPV. · Use birth control if it's important to you to prevent pregnancy. Talk with your doctor about the choices available and what might be best for you. · If you think you may have a problem with alcohol or drug use, talk to your doctor. This includes prescription medicines (such as amphetamines and opioids) and illegal drugs (such as cocaine and methamphetamine). Your doctor can help you figure out what type of treatment is best for you. · Protect your skin from too much sun.  When you're outdoors from 10 a.m. to 4 p.m., stay in the shade or cover up with clothing and a hat with a wide brim. Wear sunglasses that block UV rays. Even when it's cloudy, put broad-spectrum sunscreen (SPF 30 or higher) on any exposed skin. · See a dentist one or two times a year for checkups and to have your teeth cleaned. · Wear a seat belt in the car. When should you call for help? Watch closely for changes in your health, and be sure to contact your doctor if you have any problems or symptoms that concern you. Where can you learn more? Go to https://Miradiapeurvashieweb.Talking Data. org and sign in to your [facility] account. Enter P072 in the [facility] box to learn more about \"Well Visit, Ages 25 to 48: Care Instructions. \"     If you do not have an account, please click on the \"Sign Up Now\" link. Current as of: February 11, 2021               Content Version: 13.0  © 0979-7689 [employer]. Care instructions adapted under license by [facility] ([facility]). If you have questions about a medical condition or this instruction, always ask your healthcare professional. [place] [address] any warranty or liability for your use of this information. Smoking Cessation  This document explains the best ways for you to quit smoking and new treatments to help. It lists new medicines that can double or triple your chances of quitting and quitting for good. It also considers ways to avoid relapses and concerns you may have about quitting, including weight gain. NICOTINE: A POWERFUL ADDICTION  If you have tried to quit smoking, you know how hard it can be. It is hard because nicotine is a very addictive drug. For some people, it can be as addictive as heroin or cocaine. Usually, people make 2 or 3 tries, or more, before finally being able to quit. Each time you try to quit, you can learn about what helps and what hurts. Quitting takes hard work and a lot of effort, but you can quit smoking.   QUITTING SMOKING IS ONE OF THE MOST IMPORTANT THINGS YOU WILL EVER DO.  · You will live longer, feel better, and live better. · The impact on your body of quitting smoking is felt almost immediately:  · Within 20 minutes, blood pressure decreases. Pulse returns to its normal level. · After 8 hours, carbon monoxide levels in the blood return to normal. Oxygen level increases. · After 24 hours, chance of heart attack starts to decrease. Breath, hair, and body stop smelling like smoke. · After 48 hours, damaged nerve endings begin to recover. Sense of taste and smell improve. · After 72 hours, the body is virtually free of nicotine. Bronchial tubes relax and breathing becomes easier. · After 2 to 12 weeks, lungs can hold more air. Exercise becomes easier and circulation improves. · Quitting will reduce your risk of having a heart attack, stroke, cancer, or lung disease:  · After 1 year, the risk of coronary heart disease is cut in half. · After 5 years, the risk of stroke falls to the same as a nonsmoker. · After 10 years, the risk of lung cancer is cut in half and the risk of other cancers decreases significantly. · After 15 years, the risk of coronary heart disease drops, usually to the level of a nonsmoker. · If you are pregnant, quitting smoking will improve your chances of having a healthy baby. · The people you live with, especially your children, will be healthier. · You will have extra money to spend on things other than cigarettes. FIVE KEYS TO QUITTING  Studies have shown that these 5 steps will help you quit smoking and quit for good. You have the best chances of quitting if you use them together:  1. Get ready. 2. Get support and encouragement. 3. Learn new skills and behaviors. 4. Get medicine to reduce your nicotine addiction and use it correctly. 5. Be prepared for relapse or difficult situations. Be determined to continue trying to quit, even if you do not succeed at first.  1. GET READY  · Set a quit date. · Change your environment.   · Get rid of ALL cigarettes, ashtrays, matches, and lighters in your home, car, and place of work. · Do not let people smoke in your home. · Review your past attempts to quit. Think about what worked and what did not. · Once you quit, do not smoke. NOT EVEN A PUFF! 2. GET SUPPORT AND ENCOURAGEMENT  Studies have shown that you have a better chance of being successful if you have help. You can get support in many ways. · Tell your family, friends, and coworkers that you are going to quit and need their support. Ask them not to smoke around you. · Talk to your caregivers (doctor, dentist, nurse, pharmacist, psychologist, and/or smoking counselor). · Get individual, group, or telephone counseling and support. The more counseling you have, the better your chances are of quitting. Programs are available at [facility]. Call your local health department for information about programs in your area. · Spiritual beliefs and practices may help some smokers quit. · Quit meters are small computer programs online or downloadable that keep track of quit statistics, such as amount of \"quit-time,\" cigarettes not smoked, and money saved. · Many smokers find one or more of the many self-help books available useful in helping them quit and stay off tobacco.  3. LEARN NEW SKILLS AND BEHAVIORS  · Try to distract yourself from urges to smoke. Talk to someone, go for a walk, or occupy your time with a task. · When you first try to quit, change your routine. Take a different route to work. Drink tea instead of coffee. Eat breakfast in a different place. · Do something to reduce your stress. Take a hot bath, exercise, or read a book. · Plan something enjoyable to do every day. Reward yourself for not smoking. · Explore interactive web-based programs that specialize in helping you quit. 4. GET MEDICINE AND USE IT CORRECTLY  Medicines can help you stop smoking and decrease the urge to smoke.  Combining medicine with the above behavioral methods and support can quadruple your chances of successfully quitting smoking. The [place] Food and Drug Administration (FDA) has approved 7 medicines to help you quit smoking. These medicines fall into 3 categories. · Nicotine replacement therapy (delivers nicotine to your body without the negative effects and risks of smoking):  · Nicotine gum: Available over-the-counter. · Nicotine lozenges: Available over-the-counter. · Nicotine inhaler: Available by prescription. · Nicotine nasal spray: Available by prescription. · Nicotine skin patches (transdermal): Available by prescription and over-the-counter. · Antidepressant medicine (helps people abstain from smoking, but how this works is unknown):  · Bupropion sustained-release (SR) tablets: Available by prescription. · Nicotinic receptor partial agonist (simulates the effect of nicotine in your brain): · Varenicline tartrate tablets: Available by prescription. · Ask your caregiver for advice about which medicines to use and how to use them. Carefully read the information on the package. · Everyone who is trying to quit may benefit from using a medicine. If you are pregnant or trying to become pregnant, nursing an infant, you are under age 25, or you smoke fewer than 10 cigarettes per day, talk to your caregiver before taking any nicotine replacement medicines. · You should stop using a nicotine replacement product and call your caregiver if you experience nausea, dizziness, weakness, vomiting, fast or irregular heartbeat, mouth problems with the lozenge or gum, or redness or swelling of the skin around the patch that does not go away. · Do not use any other product containing nicotine while using a nicotine replacement product.   · Talk to your caregiver before using these products if you have diabetes, heart disease, asthma, stomach ulcers, you had a recent heart attack, you have high blood pressure that is not controlled with medicine, a history of irregular heartbeat, or you have been prescribed medicine to help you quit smoking. 5. BE PREPARED FOR RELAPSE OR DIFFICULT SITUATIONS  · Most relapses occur within the first 3 months after quitting. Do not be discouraged if you start smoking again. Remember, most people try several times before they finally quit. · You may have symptoms of withdrawal because your body is used to nicotine. You may crave cigarettes, be irritable, feel very hungry, cough often, get headaches, or have difficulty concentrating. · The withdrawal symptoms are only temporary. They are strongest when you first quit, but they will go away within 10 to 14 days. Here are some difficult situations to watch for:  · Alcohol. Avoid drinking alcohol. Drinking lowers your chances of successfully quitting. · Caffeine. Try to reduce the amount of caffeine you consume. It also lowers your chances of successfully quitting. · Other smokers. Being around smoking can make you want to smoke. Avoid smokers. · Weight gain. Many smokers will gain weight when they quit, usually less than 10 pounds. Eat a healthy diet and stay active. Do not let weight gain distract you from your main goal, quitting smoking. Some medicines that help you quit smoking may also help delay weight gain. You can always lose the weight gained after you quit. · Bad mood or depression. There are a lot of ways to improve your mood other than smoking. If you are having problems with any of these situations, talk to your caregiver. SPECIAL SITUATIONS AND CONDITIONS  Studies suggest that everyone can quit smoking. Your situation or condition can give you a special reason to quit. · Pregnant women/new mothers: By quitting, you protect your baby's health and your own. · Hospitalized patients: By quitting, you reduce health problems and help healing. · Heart attack patients: By quitting, you reduce your risk of a second heart attack.   · Lung, head, and neck cancer patients: By quitting, you reduce your chance of a second cancer. · Parents of children and adolescents: By quitting, you protect your children from illnesses caused by secondhand smoke. QUESTIONS TO THINK ABOUT  Think about the following questions before you try to stop smoking. You may want to talk about your answers with your caregiver. · Why do you want to quit? · If you tried to quit in the past, what helped and what did not? · What will be the most difficult situations for you after you quit? How will you plan to handle them? · Who can help you through the tough times? Your family? Friends? Caregiver? · What pleasures do you get from smoking? What ways can you still get pleasure if you quit? Here are some questions to ask your caregiver:  · How can you help me to be successful at quitting? · What medicine do you think would be best for me and how should I take it? · What should I do if I need more help? · What is smoking withdrawal like? How can I get information on withdrawal?  Quitting takes hard work and a lot of effort, but you can quit smoking. FOR MORE INFORMATION   Smokefree. gov (PortableGrid.se) provides free, accurate, evidence-based information and professional assistance to help support the immediate and long-term needs of people trying to quit smoking. Document Released: 12/12/2002 Document Revised: 12/06/2012 Document Reviewed: 10/04/2010  CHRIS ZAMORA [facility] Patient Information ©2012 Mer Cutler.

## 2021-11-22 NOTE — PROGRESS NOTES
Well Adult Note  Name: Sully Mode Date: 2021   MRN: [id number] Sex: Male   Age: 39 y.o. Ethnicity: Non- / Non    : 1985 Race: White (non-)      Kavin Baez is here for well adult exam.  History:  Smokes 1 pack a day would like to stop smoking has used patches in the past and they worked well for him. GERD    HPI:     Symptoms:  heartburn  Length of symptoms: 6 months  Current therapy and response:  Taking prilosec 20 mg daily and this has improved with current meds  Recent change in symptoms? No  Symptoms associated with certain foods? Yes  Alcohol use? No  Tobacco use? Yes    Abdominal Pain? No  Mid Back Pain? No  Melena? No      Review of Systems   Constitutional: Negative for chills, fatigue and fever. HENT: Negative. Respiratory: Negative. Cardiovascular: Negative. Gastrointestinal: Negative. Musculoskeletal: Negative. Skin: Negative. Neurological: Positive for headaches (taking lamictal 50 mg daily, started smoking  medical marijuana and this has decreased his headaches, wants to wean of flamictal. ). Negative for dizziness and facial asymmetry. Psychiatric/Behavioral: Negative for agitation, behavioral problems, dysphoric mood, self-injury, sleep disturbance and suicidal ideas. The patient is nervous/anxious and is hyperactive. Allergies   Allergen Reactions    Pcn [Penicillins] Rash         Prior to Visit Medications    Medication Sig Taking?  Authorizing Provider   omeprazole (PRILOSEC) 20 MG delayed release capsule Take 1 capsule by mouth daily Yes ROJELIO Mckee CNP   lamoTRIgine (LAMICTAL) 25 MG tablet Take 1 tablet by mouth daily Yes ROJELIO Mckee CNP   nicotine (NICODERM CQ) 21 MG/24HR Place 1 patch onto the skin every 24 hours Yes ROJELIO Mckee CNP   nicotine (NICODERM CQ) 14 MG/24HR Place 1 patch onto the skin every 24 hours Yes ROJELIO Mckee CNP   nicotine ([place]) 7 MG/24HR Place 1 patch onto the skin every 24 hours Yes Mabelene Province, APRN - CNP   metoprolol tartrate (LOPRESSOR) 25 MG tablet Take 1 tablet by mouth 2 times daily Yes Mabelene Province, APRN - CNP   amitriptyline (ELAVIL) 100 MG tablet Take 1 tablet by mouth nightly Yes Mabelene Province, APRN - CNP   ondansetron (ZOFRAN) 4 MG tablet Take 1 tablet by mouth 3 times daily as needed for Nausea or Vomiting Yes Mabelene Province, APRN - CNP   diclofenac sodium (VOLTAREN) 1 % GEL Apply 2 g topically 2 times daily Yes Mabelene Province, APRN - CNP   buPROPion (WELLBUTRIN XL) 150 MG extended release tablet Take 1 tablet by mouth every morning Yes Mabelene Province, APRN - CNP   albuterol sulfate HFA (PROVENTIL HFA) 108 (90 Base) MCG/ACT inhaler Inhale 2 puffs into the lungs every 6 hours as needed for Wheezing Yes Mabelene Province, APRN - CNP   ibuprofen (ADVIL;MOTRIN) 600 MG tablet Take 1 tablet by mouth every 8 hours as needed for Pain Yes Lexii Lambert MD   acetaminophen (APAP EXTRA STRENGTH) 500 MG tablet Take 2 tablets by mouth every 8 hours as needed for Pain Yes Lexii Lambert MD   SUMAtriptan (IMITREX) 25 MG tablet Take 1 tablet by mouth once as needed for Migraine  Mabelene Province, APRN - CNP         Past Medical History:   Diagnosis Date    Anxiety     Asthma     when he was younger he was told he had asthma     Concussion 01/16/2019    Depression     Migraine     Obstructive sleep apnea 9/29/2014    Tobacco abuse        Past Surgical History:   Procedure Laterality Date    VASECTOMY  12/2018         Family History   Problem Relation Age of Onset    Other Mother         hepatitis     Diabetes Mother     High Blood Pressure Mother     Depression Mother     Anxiety Disorder Mother     Cancer Maternal Grandmother     Cancer Maternal Grandfather        Social History     Tobacco Use    Smoking status: Current Some Day Smoker     Packs/day: 0.03     Types: Cigarettes    Smokeless tobacco: Never Used adult exam without abnormal findings Yes    Personal history of tobacco use, presenting hazards to health     Gastroesophageal reflux disease, unspecified whether esophagitis present     Immunization due     Anxiety     Mood swing     Medical marijuana use     Moderate persistent asthma without complication       Chronic conditions stable continue meds as ordered     Personalized Preventive Plan   Current Health Maintenance Status  Immunization History   Administered Date(s) Administered    Influenza A (A2O3-23) Vaccine PF IM 02/24/2010    Influenza Virus Vaccine 10/28/2020    Influenza, MDCK Quadv, IM, PF (Flucelvax 2 yrs and older) 11/22/2021    Influenza, Quadv, IM, PF (6 mo and older Fluzone, Flulaval, Fluarix, and 3 yrs and older Afluria) 10/21/2019    Tdap (Boostrix, Adacel) 08/26/2013        Health Maintenance   Topic Date Due    COVID-19 Vaccine (1) Never done    Pneumococcal 0-64 years Vaccine (1 of 2 - PPSV23) Never done    DTaP/Tdap/Td vaccine (2 - Td or Tdap) 08/26/2023    Flu vaccine  Completed    Hepatitis A vaccine  Aged Out    Hepatitis B vaccine  Aged Out    Hib vaccine  Aged Out    Meningococcal (ACWY) vaccine  Aged Out    Varicella vaccine  Discontinued    Hepatitis C screen  Discontinued    HIV screen  Discontinued     Recommendations for Preventive Services Due: see orders and patient instructions/AVS.    Return in about 6 months (around 5/22/2022) for med refill.

## 2022-01-11 DIAGNOSIS — F33.41 RECURRENT MAJOR DEPRESSIVE DISORDER, IN PARTIAL REMISSION (HCC): ICD-10-CM

## 2022-01-12 RX ORDER — BUPROPION HYDROCHLORIDE 150 MG/1
150 TABLET ORAL EVERY MORNING
Qty: 90 TABLET | Refills: 3 | Status: SHIPPED | OUTPATIENT
Start: 2022-01-12

## 2022-01-17 ENCOUNTER — HOSPITAL ENCOUNTER (EMERGENCY)
Age: 37
Discharge: HOME | End: 2022-01-17
Payer: COMMERCIAL

## 2022-01-17 VITALS
OXYGEN SATURATION: 100 % | BODY MASS INDEX: 24.34 KG/M2 | HEIGHT: 70 IN | WEIGHT: 170 LBS | SYSTOLIC BLOOD PRESSURE: 136 MMHG | HEART RATE: 83 BPM | TEMPERATURE: 97.5 F | RESPIRATION RATE: 16 BRPM | DIASTOLIC BLOOD PRESSURE: 74 MMHG

## 2022-01-17 DIAGNOSIS — U07.1 COVID-19: Primary | ICD-10-CM

## 2022-01-17 LAB — SARS-COV-2, NAA: DETECTED

## 2022-01-17 PROCEDURE — G0463 HOSPITAL OUTPT CLINIC VISIT: HCPCS

## 2022-01-17 PROCEDURE — 99213 OFFICE O/P EST LOW 20 MIN: CPT

## 2022-01-17 PROCEDURE — 99213 OFFICE O/P EST LOW 20 MIN: CPT | Performed by: NURSE PRACTITIONER

## 2022-01-17 PROCEDURE — 87635 SARS-COV-2 COVID-19 AMP PRB: CPT

## 2022-01-17 ASSESSMENT — ENCOUNTER SYMPTOMS
DIARRHEA: 0
COUGH: 1
SORE THROAT: 0
SHORTNESS OF BREATH: 0
VOMITING: 0
NAUSEA: 0
RHINORRHEA: 0
CHEST TIGHTNESS: 0

## 2022-01-17 NOTE — ED PROVIDER NOTES
[facility]  Urgent Care Encounter       CHIEF COMPLAINT       Chief Complaint   Patient presents with    Generalized Body Aches       Nurses Notes reviewed and I agree except as noted in the HPI. HISTORY OF PRESENT ILLNESS   Mahin Morillo is a 44 y.o. male who presents to the [facility] urgent care for evaluation of generalized body aches. He reports that his symptoms started Friday, 4 days ago. He reports that his son had COVID 2 weeks ago. He reports his symptoms as generalized body aches, headache, decreased appetite, fatigue, diaphoresis, chills, and fever. He denies nausea, vomiting, diarrhea. Denies loss of taste or smell. Denies nasal congestion, rhinorrhea or postnasal drainage. Denies pharyngitis. The history is provided by the patient. No  was used. REVIEW OF SYSTEMS     Review of Systems   Constitutional: Positive for appetite change, chills, diaphoresis, fatigue and fever. Negative for activity change. HENT: Negative for ear discharge, ear pain, rhinorrhea and sore throat. Respiratory: Positive for cough. Negative for chest tightness and shortness of breath. Cardiovascular: Negative for chest pain. Gastrointestinal: Negative for diarrhea, nausea and vomiting. Genitourinary: Negative for dysuria. Skin: Negative for rash. Allergic/Immunologic: Negative for environmental allergies and food allergies. Neurological: Positive for headaches. Negative for dizziness. PAST MEDICAL HISTORY         Diagnosis Date    Anxiety     Asthma     when he was younger he was told he had asthma     Concussion 01/16/2019    Depression     Migraine     Obstructive sleep apnea 9/29/2014    Tobacco abuse        SURGICALHISTORY     Patient  has a past surgical history that includes Vasectomy (12/2018).     CURRENT MEDICATIONS       Discharge Medication List as of 1/17/2022  4:07 PM      CONTINUE these medications which have NOT CHANGED    Details   buPROPion (WELLBUTRIN XL) 150 MG extended release tablet Take 1 tablet by mouth every morning, Disp-90 tablet, R-3Normal      omeprazole (PRILOSEC) 20 MG delayed release capsule Take 1 capsule by mouth daily, Disp-90 capsule, R-3Normal      lamoTRIgine (LAMICTAL) 25 MG tablet Take 1 tablet by mouth daily, Disp-90 tablet, R-1Normal      nicotine (NICODERM CQ) 21 MG/24HR Place 1 patch onto the skin every 24 hours, Disp-30 patch, R-0Normal      nicotine (NICODERM CQ) 14 MG/24HR Place 1 patch onto the skin every 24 hours, Disp-30 patch, R-0Normal      nicotine (NICODERM CQ) 7 MG/24HR Place 1 patch onto the skin every 24 hours, Disp-30 patch, R-3Normal      metoprolol tartrate (LOPRESSOR) 25 MG tablet Take 1 tablet by mouth 2 times daily, Disp-180 tablet, R-4Normal      amitriptyline (ELAVIL) 100 MG tablet Take 1 tablet by mouth nightly, Disp-90 tablet, R-3Normal      ondansetron (ZOFRAN) 4 MG tablet Take 1 tablet by mouth 3 times daily as needed for Nausea or Vomiting, Disp-30 tablet, R-0Normal      diclofenac sodium (VOLTAREN) 1 % GEL Apply 2 g topically 2 times daily, Topical, 2 TIMES DAILY Starting Mon 5/24/2021, Disp-100 g, R-1, Normal      SUMAtriptan (IMITREX) 25 MG tablet Take 1 tablet by mouth once as needed for Migraine, Disp-9 tablet, R-5Normal      ibuprofen (ADVIL;MOTRIN) 600 MG tablet Take 1 tablet by mouth every 8 hours as needed for Pain, Disp-90 tablet, R-5      acetaminophen (APAP EXTRA STRENGTH) 500 MG tablet Take 2 tablets by mouth every 8 hours as needed for Pain, Disp-180 tablet, R-5             ALLERGIES     Patient is is allergic to pcn [penicillins].     Patients   Immunization History   Administered Date(s) Administered    Influenza A (H6H3-40) Vaccine PF IM 02/24/2010    Influenza Virus Vaccine 10/28/2020    Influenza, MDCK Quadv, IM, PF (Flucelvax 2 yrs and older) 11/22/2021    Influenza, Quadv, IM, PF (6 mo and older Fluzone, Flulaval, Fluarix, and 3 yrs and older Afluria) 10/21/2019    Tdap (Boostrix, Adacel) 08/26/2013       FAMILY HISTORY     Patient's family history includes Anxiety Disorder in his mother; Cancer in his maternal grandfather and maternal grandmother; Depression in his mother; Diabetes in his mother; High Blood Pressure in his mother; Other in his mother. SOCIAL HISTORY     Patient  reports that he has been smoking cigarettes. He has been smoking about 0.03 packs per day. He has never used smokeless tobacco. He reports current alcohol use. He reports current drug use. Frequency: 7.00 times per week. Drug: Marijuana Funmilayo Mobley). PHYSICAL EXAM     ED TRIAGE VITALS  BP: 136/74, Temp: 97.5 °F (36.4 °C), Pulse: 83, Resp: 16, SpO2: 100 %,Estimated body mass index is 24.39 kg/m² as calculated from the following:    Height as of this encounter: 5' 10\" (1.778 m). Weight as of this encounter: 170 lb (77.1 kg). ,No LMP for male patient. Physical Exam  Vitals and nursing note reviewed. Constitutional:       General: He is not in acute distress. Appearance: Normal appearance. He is not ill-appearing, toxic-appearing or diaphoretic. HENT:      Head: Normocephalic. Right Ear: Ear canal and external ear normal.      Left Ear: Ear canal and external ear normal.      Nose: Nose normal. No congestion or rhinorrhea. Mouth/Throat:      Mouth: Mucous membranes are moist.      Pharynx: Oropharynx is clear. No oropharyngeal exudate or posterior oropharyngeal erythema. Cardiovascular:      Rate and Rhythm: Normal rate. Pulses: Normal pulses. Pulmonary:      Effort: Pulmonary effort is normal. No respiratory distress. Breath sounds: No stridor. No wheezing or rhonchi. Abdominal:      General: Abdomen is flat. Bowel sounds are normal.      Palpations: Abdomen is soft. Musculoskeletal:         General: No swelling or tenderness. Normal range of motion. Cervical back: Normal range of motion.    Neurological:      General: No focal deficit present. Mental Status: He is alert and oriented to person, place, and time. Psychiatric:         Mood and Affect: Mood normal.         Behavior: Behavior normal.         DIAGNOSTIC RESULTS     Labs:  Results for orders placed or performed during the hospital encounter of 01/17/22   COVID-19, Rapid   Result Value Ref Range    SARS-CoV-2, [place] DETECTED (AA) NOT DETECTED       IMAGING:    No orders to display         EKG: None      URGENT CARE COURSE:     Vitals:    01/17/22 1547   BP: 136/74   Pulse: 83   Resp: 16   Temp: 97.5 °F (36.4 °C)   TempSrc: Infrared   SpO2: 100%   Weight: 170 lb (77.1 kg)   Height: 5' 10\" (1.778 m)       Medications - No data to display         PROCEDURES:  None    FINAL IMPRESSION      1. COVID-19          DISPOSITION/ PLAN     Patient seen and evaluated for the above symptoms. A rapid Covid swab was obtained and positive. He is instructed to isolate for 5 days from symptom onset. He is instructed to return to work on day 6 if symptoms are manageable and he is afebrile. He is instructed to wear a face covering for the next 5 days. He is provided a work note for 2 days with the current CDC recommendations for isolation. He is instructed to use over-the-counter Tylenol and Motrin for pain or fever. He is instructed to use zinc, vitamin C, vitamin D3, and Pepcid. He is instructed to use Mucinex D, Flonase, and Zyrtec for symptom relief. He is instructed to follow-up with his PCP in 3 to 5 days with new or worsening symptoms. He is agreeable with the above plan and denies questions or concerns at this time.         PATIENT REFERRED TO:  ROJELIO Marcelo CNP  [address] [place] [id number]      DISCHARGE MEDICATIONS:  Discharge Medication List as of 1/17/2022  4:07 PM          Discharge Medication List as of 1/17/2022  4:07 PM      STOP taking these medications       albuterol sulfate HFA (PROVENTIL HFA) 108 (90 Base) MCG/ACT inhaler Comments:   Reason for Stopping: Discharge Medication List as of 1/17/2022  4:07 PM          ROJELIO Hannah CNP    (Please note that portions of this note were completed with a voice recognition program. Efforts were made to edit the dictations but occasionally words are mis-transcribed.)           ROJELIO Hannah CNP  01/17/22 5933

## 2022-01-17 NOTE — Clinical Note
Randy Allen was seen and treated in our emergency department on 1/17/2022. He may return to work on 01/19/2022. May be off work 2 days, CDC recommendation of isolation for 5 days then can return to work if symptoms are improving or resolved with a mask. If you have any questions or concerns, please don't hesitate to call.       Jesenia Sullivan, APRN - CNP

## 2022-01-18 ENCOUNTER — CARE COORDINATION (OUTPATIENT)
Dept: CARE COORDINATION | Age: 37
End: 2022-01-18

## 2022-01-20 ENCOUNTER — CARE COORDINATION (OUTPATIENT)
Dept: CARE COORDINATION | Age: 37
End: 2022-01-20

## 2022-01-20 NOTE — CARE COORDINATION
Patient called and left a voicemail regarding ACP. Attempted to return patients call and voicemail has not been set up.

## 2022-01-25 ENCOUNTER — CARE COORDINATION (OUTPATIENT)
Dept: CARE COORDINATION | Age: 37
End: 2022-01-25

## 2022-03-21 ENCOUNTER — OFFICE VISIT (OUTPATIENT)
Dept: FAMILY MEDICINE CLINIC | Age: 37
End: 2022-03-21
Payer: COMMERCIAL

## 2022-03-21 VITALS
HEART RATE: 90 BPM | HEIGHT: 70 IN | TEMPERATURE: 98.2 F | BODY MASS INDEX: 25.34 KG/M2 | DIASTOLIC BLOOD PRESSURE: 74 MMHG | RESPIRATION RATE: 14 BRPM | OXYGEN SATURATION: 96 % | SYSTOLIC BLOOD PRESSURE: 112 MMHG | WEIGHT: 177 LBS

## 2022-03-21 DIAGNOSIS — K11.20 SIALOADENITIS OF SUBMANDIBULAR GLAND: Primary | ICD-10-CM

## 2022-03-21 DIAGNOSIS — R68.84 MANDIBLE PAIN: ICD-10-CM

## 2022-03-21 DIAGNOSIS — R59.1 HEAD AND NECK LYMPHADENOPATHY: ICD-10-CM

## 2022-03-21 PROCEDURE — 99213 OFFICE O/P EST LOW 20 MIN: CPT | Performed by: NURSE PRACTITIONER

## 2022-03-21 RX ORDER — NAPROXEN 375 MG/1
375 TABLET ORAL 2 TIMES DAILY WITH MEALS
Qty: 60 TABLET | Refills: 1 | Status: SHIPPED | OUTPATIENT
Start: 2022-03-21

## 2022-03-21 ASSESSMENT — PATIENT HEALTH QUESTIONNAIRE - PHQ9
SUM OF ALL RESPONSES TO PHQ QUESTIONS 1-9: 0
2. FEELING DOWN, DEPRESSED OR HOPELESS: 0
SUM OF ALL RESPONSES TO PHQ QUESTIONS 1-9: 0
1. LITTLE INTEREST OR PLEASURE IN DOING THINGS: 0
SUM OF ALL RESPONSES TO PHQ9 QUESTIONS 1 & 2: 0

## 2022-03-21 ASSESSMENT — ENCOUNTER SYMPTOMS
SINUS PAIN: 0
RHINORRHEA: 0
VOICE CHANGE: 0
SORE THROAT: 0
RESPIRATORY NEGATIVE: 1
TROUBLE SWALLOWING: 0
SINUS PRESSURE: 0

## 2022-03-21 NOTE — PROGRESS NOTES
[facility] [place] [address] MEDICINE   [address] [place] [address]  Dept: [phone]  Dept Fax: [phone]  Loc: [phone]    Sola Topete is a 44 y.o. Carmen Madisonna presents today for his medical conditions/complaints as noted below. Steve Kyreeans c/o of Other (left side jaw pain, up to his temple. started last night. can't tell if it is teeth )      HPI:      Pt states the left side of his face and jaw and ear started hurting today, He denies a fever, it hurts to chew on both side thinks jaw is swollen. Has never had this before, pain is a deep ache has been taking tylenol without relief. He is able to swallow.            Current Outpatient Medications   Medication Sig Dispense Refill    dicloxacillin (DYNAPEN) 500 MG capsule Take 1 capsule by mouth 4 times daily for 10 days 40 capsule 0    naproxen (NAPROSYN) 375 MG tablet Take 1 tablet by mouth 2 times daily (with meals) 60 tablet 1    buPROPion (WELLBUTRIN XL) 150 MG extended release tablet Take 1 tablet by mouth every morning 90 tablet 3    omeprazole (PRILOSEC) 20 MG delayed release capsule Take 1 capsule by mouth daily 90 capsule 3    lamoTRIgine (LAMICTAL) 25 MG tablet Take 1 tablet by mouth daily 90 tablet 1    nicotine (NICODERM CQ) 21 MG/24HR Place 1 patch onto the skin every 24 hours 30 patch 0    nicotine (NICODERM CQ) 14 MG/24HR Place 1 patch onto the skin every 24 hours 30 patch 0    nicotine (NICODERM CQ) 7 MG/24HR Place 1 patch onto the skin every 24 hours 30 patch 3    metoprolol tartrate (LOPRESSOR) 25 MG tablet Take 1 tablet by mouth 2 times daily 180 tablet 4    amitriptyline (ELAVIL) 100 MG tablet Take 1 tablet by mouth nightly 90 tablet 3    ondansetron (ZOFRAN) 4 MG tablet Take 1 tablet by mouth 3 times daily as needed for Nausea or Vomiting 30 tablet 0    diclofenac sodium (VOLTAREN) 1 % GEL Apply 2 g topically 2 times daily 100 g 1    ibuprofen (ADVIL;MOTRIN) 600 MG tablet Take 1 tablet by mouth every 8 hours as needed for Pain 90 tablet 5    acetaminophen (APAP EXTRA STRENGTH) 500 MG tablet Take 2 tablets by mouth every 8 hours as needed for Pain 180 tablet 5    SUMAtriptan (IMITREX) 25 MG tablet Take 1 tablet by mouth once as needed for Migraine 9 tablet 5     No current facility-administered medications for this visit. Past Medical History:   Diagnosis Date    Anxiety     Asthma     when he was younger he was told he had asthma     Concussion 01/16/2019    Depression     Migraine     Obstructive sleep apnea 9/29/2014    Tobacco abuse       Past Surgical History:   Procedure Laterality Date    VASECTOMY  12/2018     Family History   Problem Relation Age of Onset    Other Mother         hepatitis     Diabetes Mother     High Blood Pressure Mother     Depression Mother     Anxiety Disorder Mother     Cancer Maternal Grandmother     Cancer Maternal Grandfather      Social History     Tobacco Use    Smoking status: Current Some Day Smoker     Packs/day: 0.03     Types: Cigarettes    Smokeless tobacco: Never Used   Substance Use Topics    Alcohol use: Yes     Comment: rare        Allergies   Allergen Reactions    Pcn [Penicillins] Rash       Health Maintenance   Topic Date Due    COVID-19 Vaccine (1) Never done    Pneumococcal 0-64 years Vaccine (1 of 2 - PPSV23) Never done    Depression Screen  Never done    DTaP/Tdap/Td vaccine (2 - Td or Tdap) 08/26/2023    Flu vaccine  Completed    Hepatitis A vaccine  Aged Out    Hepatitis B vaccine  Aged Out    Hib vaccine  Aged Out    Meningococcal (ACWY) vaccine  Aged Out    Varicella vaccine  Discontinued    Hepatitis C screen  Discontinued    HIV screen  Discontinued       Subjective:      Review of Systems   Constitutional: Negative for fatigue and fever.    HENT: Negative for congestion, dental problem, hearing loss, rhinorrhea, sinus pressure, sinus pain, sore throat, tinnitus, trouble swallowing and voice change. Respiratory: Negative. Cardiovascular: Negative. Musculoskeletal: Positive for arthralgias and myalgias. Skin: Negative. Neurological: Negative for dizziness, facial asymmetry, light-headedness, numbness and headaches. Objective:      /74   Pulse 90   Temp 98.2 °F (36.8 °C) (Oral)   Resp 14   Ht 5' 10\" (1.778 m)   Wt 177 lb (80.3 kg)   SpO2 96%   BMI 25.40 kg/m²      Physical Exam  Vitals and nursing note reviewed. Constitutional:       Appearance: He is not ill-appearing. HENT:      Mouth/Throat:      Mouth: Mucous membranes are moist.      Dentition: Abnormal dentition. Does not have dentures. Dental caries present. No gingival swelling, dental abscesses or gum lesions. Tongue: No lesions. Tongue does not deviate from midline. Comments: Dental decay noted, no fractured teeth, no oral abscesses or gum or gingival swelling. Pain with palpation of left preauricular area no popping or grinding with opening or closing of jaw     Left aneterior cervical lymphadenopathy with palpation. Pain with palpation of left parotid gland area,    Cardiovascular:      Rate and Rhythm: Regular rhythm. Pulses: Normal pulses. Heart sounds: Normal heart sounds. No murmur heard. Pulmonary:      Effort: Pulmonary effort is normal. No respiratory distress. Breath sounds: Normal breath sounds. Musculoskeletal:         General: Normal range of motion. Skin:     General: Skin is warm and dry. Neurological:      Mental Status: He is alert. Assessment/Plan:           1. Sialoadenitis of submandibular gland  Call for any worsening symptoms  Suck on a straw and tart candy  Warm facial compresses   May need to consider stone duct removal if no better. - dicloxacillin (DYNAPEN) 500 MG capsule; Take 1 capsule by mouth 4 times daily for 10 days  Dispense: 40 capsule; Refill: 0  - naproxen (NAPROSYN) 375 MG tablet;  Take 1 tablet by mouth 2 times daily (with meals)  Dispense: 60 tablet; Refill: 1    2. Head and neck lymphadenopathy  #1    3. Mandible pain  #1  Naprosyn ordered. Return if symptoms worsen or fail to improve. Reccommended tobaccocessation options including pharmacologic methods, counseled great than 3 minutesduring this visit:  Yes[]  No  []       Patient given educational materials -see patient instructions. Discussed use, benefit, and side effects of prescribedmedications. All patient questions answered. Pt voiced understanding. Reviewedhealth maintenance. Instructed to continue current medications, diet and exercise. Patient agreed with treatment plan. Follow up as directed.        Electronicallysigned by ROJELIO Saha CNP on 3/21/2022 at 5:29 PM

## 2022-03-21 NOTE — PATIENT INSTRUCTIONS
Patient Education        Salivary Gland Infection: Care Instructions  Overview     Salivary glands make saliva, or spit. An infection in these glands can make the glands swell and hurt. An infection can happen when bacteria gets into the gland. This is more common in people who have diabetes, poor tooth care, or stones in these glands. Bacteria can build up and cause an infection if you don't get enough fluids. It can also happen if the flow of saliva gets blocked by a small stone in the gland. A virus can also cause an infection. Your care depends on the cause. If the problem is caused by bacteria, your doctor may prescribe antibiotics. Home treatment may help. You can drink more fluids or suck on sugar-free lemon drops to increase the flow of saliva. Follow-up care is a key part of your treatment and safety. Be sure to make and go to all appointments, and call your doctor if you are having problems. It's also a good idea to know your test results and keep a list of the medicines you take. How can you care for yourself at home? · If your doctor prescribed antibiotics, take them as directed. Do not stop taking them just because you feel better. You need to take the full course of antibiotics. · Take an over-the-counter pain medicine if needed, such as acetaminophen (Tylenol), ibuprofen (Advil, Motrin), or naproxen (Aleve). Be safe with medicines. Read and follow all instructions on the label. · Do not take two or more pain medicines at the same time unless the doctor told you to. Many pain medicines have acetaminophen, which is Tylenol. Too much acetaminophen (Tylenol) can be harmful. · Drink plenty of fluids. If you have kidney, heart, or liver disease and have to limit fluids, talk with your doctor before you increase the amount of fluids you drink. · Put an ice or heat pack (whichever feels better) on the swollen jaw for 10 to 20 minutes at a time.  Put a thin cloth between the ice or heat pack and your skin. · Suck on ice chips or ice treats such as sugar-free flavored ice pops. Eat soft foods that do not have to be chewed much. · Use sugar-free gum or candies such as lemon drops. They increase saliva. · Avoid over-the-counter medicines that can give you a dry mouth. These medicines include antihistamines, such as diphenhydramine (Benadryl) or chlorpheniramine (Chlor-Trimeton). · Gently massage the infected gland. When should you call for help? Call your doctor now or seek immediate medical care if:    · You have symptoms of infection, such as:  ? Increased pain, swelling, warmth, or redness. ? Red streaks leading from the area. ? Pus draining from the area. ? A fever.     · You have new pain, or your pain is worse. Watch closely for changes in your health, and be sure to contact your doctor if:    · You are not getting better as expected. Where can you learn more? Go to https://[employer].Data Camp. org and sign in to your HomeStay account. Enter F122 in the Nekst box to learn more about \"Salivary Gland Infection: Care Instructions. \"     If you do not have an account, please click on the \"Sign Up Now\" link. Current as of: September 8, 2021               Content Version: 13.1  © 2006-2021 Healthwise, Incorporated. Care instructions adapted under license by [facility] ([facility]). If you have questions about a medical condition or this instruction, always ask your healthcare professional. [place] [address] any warranty or liability for your use of this information. Patient Education        Parotitis: Care Instructions  Overview     Parotitis is a painful swelling of your parotid glands, which are salivary glands located between the ear and jaw. The most common cause is a virus, such as mumps, herpes, or Gildardo-Barr. Bacterial infections, diabetes, tumors or stones in the saliva glands, and tooth problems also may cause parotitis.   Follow-up care is a key part of your treatment and safety. Be sure to make and go to all appointments, and call your doctor if you are having problems. It's also a good idea to know your test results and keep a list of the medicines you take. How can you care for yourself at home? · Use an over-the-counter pain medicine if needed, such as acetaminophen (Tylenol), ibuprofen (Advil, Motrin), or naproxen (Aleve). Be safe with medicines. Read and follow all instructions on the label. Do not give aspirin to anyone younger than 20. It has been linked to Reye syndrome, a serious illness. · Put an ice or heat pack (whichever feels better) on the swollen jaw for 10 to 20 minutes at a time. Put a thin cloth between the ice or heat pack and the skin. · Suck on ice chips or ice treats such as Popsicles. Eat soft foods that do not have to be chewed much. · If your doctor prescribed antibiotics, take them as directed. Do not stop taking them just because you feel better. You need to take the full course of antibiotics. To prevent tooth problems  · Brush and floss every day, and have regular dental checkups. · Eat a healthy diet, and avoid sugary foods and drinks. · Do not smoke or use spit tobacco. Tobacco use slows your ability to heal. It also increases your risk for gum disease and cancer of the mouth and throat. If you need help quitting, talk to your doctor about stop-smoking programs and medicines. These can increase your chances of quitting for good. When should you call for help? Call 911 anytime you think you may need emergency care. For example, call if:    · You have trouble breathing. Call your doctor now or seek immediate medical care if:    · You have new or worse symptoms of infection, such as:  ? Increased pain, swelling, warmth, or redness. ? Red streaks leading from the area. ? Pus draining from the area. ? A fever.     · You have new pain, or the pain gets worse.    Watch closely for changes in your health, and be sure to contact your doctor if:    · You do not feel better as expected. Where can you learn more? Go to https://chpepiceweb.Play for Job. org and sign in to your [facility] account. Enter G235 in the WallStrip box to learn more about \"Parotitis: Care Instructions. \"     If you do not have an account, please click on the \"Sign Up Now\" link. Current as of: September 8, 2021               Content Version: 13.1  © 5095-8909 [employer]. Care instructions adapted under license by [facility]. If you have questions about a medical condition or this instruction, always ask your healthcare professional. [place] [address] any warranty or liability for your use of this information. Patient Education        Parotitis: Care Instructions  Overview     Parotitis is a painful swelling of your parotid glands, which are salivary glands located between the ear and jaw. The most common cause is a virus, such as mumps, herpes, or Gildardo-Barr. Bacterial infections, diabetes, tumors or stones in the saliva glands, and tooth problems also may cause parotitis. Follow-up care is a key part of your treatment and safety. Be sure to make and go to all appointments, and call your doctor if you are having problems. It's also a good idea to know your test results and keep a list of the medicines you take. How can you care for yourself at home? · Use an over-the-counter pain medicine if needed, such as acetaminophen (Tylenol), ibuprofen (Advil, Motrin), or naproxen (Aleve). Be safe with medicines. Read and follow all instructions on the label. Do not give aspirin to anyone younger than 20. It has been linked to Reye syndrome, a serious illness. · Put an ice or heat pack (whichever feels better) on the swollen jaw for 10 to 20 minutes at a time. Put a thin cloth between the ice or heat pack and the skin. · Suck on ice chips or ice treats such as Popsicles.  Eat soft foods that do not

## 2022-04-06 ENCOUNTER — TELEPHONE (OUTPATIENT)
Dept: FAMILY MEDICINE CLINIC | Age: 37
End: 2022-04-06

## 2022-04-06 NOTE — TELEPHONE ENCOUNTER
Patient reports being seen in the office last week and has completed the medication prescribed but his lymph nodes are still swollen and painful    Please advise

## 2022-04-06 NOTE — TELEPHONE ENCOUNTER
I would like to see him back in th office to re evaluate the area to see what testing is needed next. We may need to consider a ct scan of his neck.

## 2022-04-07 ENCOUNTER — OFFICE VISIT (OUTPATIENT)
Dept: FAMILY MEDICINE CLINIC | Age: 37
End: 2022-04-07
Payer: COMMERCIAL

## 2022-04-07 VITALS
TEMPERATURE: 97.8 F | SYSTOLIC BLOOD PRESSURE: 116 MMHG | WEIGHT: 180.4 LBS | RESPIRATION RATE: 14 BRPM | HEIGHT: 70 IN | HEART RATE: 92 BPM | BODY MASS INDEX: 25.83 KG/M2 | DIASTOLIC BLOOD PRESSURE: 70 MMHG | OXYGEN SATURATION: 99 %

## 2022-04-07 DIAGNOSIS — K02.9 DENTAL DECAY: ICD-10-CM

## 2022-04-07 DIAGNOSIS — K08.89 PAIN, DENTAL: Primary | ICD-10-CM

## 2022-04-07 PROCEDURE — 99213 OFFICE O/P EST LOW 20 MIN: CPT | Performed by: NURSE PRACTITIONER

## 2022-04-07 ASSESSMENT — ENCOUNTER SYMPTOMS
SINUS PAIN: 0
VOICE CHANGE: 0
TROUBLE SWALLOWING: 0
RESPIRATORY NEGATIVE: 1
RHINORRHEA: 0
SORE THROAT: 0
SINUS PRESSURE: 0

## 2022-04-07 NOTE — PROGRESS NOTES
[facility]  [address] JUAREZ [facility] [place] [address]  Dept: [phone]  Dept Fax: [phone]  Loc: [phone]    Danial King is a 44 y.o. Jacquiline Carbon presents today for his medical conditions/complaints as noted below. Coit Rings c/o of Other (still having issues with the lymph node. )      HPI:      Pt presented here last week and was given an atb and states he does not think the symptoms are any better. He notices the pain is not constant, he noticed it starting today when he started chewing on his left side. He denies facial swelling, denies temperature. Hot and cold do affect it. He does have decayed teeth. States the pain occurs in left upper jaw and radiates up to his left ear. HPI below Pt states the left side of his face and jaw and ear started hurting today, He denies a fever, it hurts to chew on both side thinks jaw is swollen. Has never had this before, pain is a deep ache has been taking tylenol without relief. He is able to swallow. Other  Pertinent negatives include no arthralgias, congestion, fatigue, fever, headaches, myalgias, numbness or sore throat.           Current Outpatient Medications   Medication Sig Dispense Refill    naproxen (NAPROSYN) 375 MG tablet Take 1 tablet by mouth 2 times daily (with meals) 60 tablet 1    buPROPion (WELLBUTRIN XL) 150 MG extended release tablet Take 1 tablet by mouth every morning 90 tablet 3    omeprazole (PRILOSEC) 20 MG delayed release capsule Take 1 capsule by mouth daily 90 capsule 3    lamoTRIgine (LAMICTAL) 25 MG tablet Take 1 tablet by mouth daily 90 tablet 1    nicotine (NICODERM CQ) 21 MG/24HR Place 1 patch onto the skin every 24 hours 30 patch 0    nicotine (NICODERM CQ) 14 MG/24HR Place 1 patch onto the skin every 24 hours 30 patch 0    nicotine (NICODERM CQ) 7 MG/24HR Place 1 patch onto the skin every 24 hours 30 patch 3    metoprolol tartrate (LOPRESSOR) 25 MG tablet Take 1 tablet by mouth 2 times daily 180 tablet 4    amitriptyline (ELAVIL) 100 MG tablet Take 1 tablet by mouth nightly 90 tablet 3    ondansetron (ZOFRAN) 4 MG tablet Take 1 tablet by mouth 3 times daily as needed for Nausea or Vomiting 30 tablet 0    diclofenac sodium (VOLTAREN) 1 % GEL Apply 2 g topically 2 times daily 100 g 1    ibuprofen (ADVIL;MOTRIN) 600 MG tablet Take 1 tablet by mouth every 8 hours as needed for Pain 90 tablet 5    acetaminophen (APAP EXTRA STRENGTH) 500 MG tablet Take 2 tablets by mouth every 8 hours as needed for Pain 180 tablet 5    SUMAtriptan (IMITREX) 25 MG tablet Take 1 tablet by mouth once as needed for Migraine 9 tablet 5     No current facility-administered medications for this visit.           Past Medical History:   Diagnosis Date    Anxiety     Asthma     when he was younger he was told he had asthma     Concussion 01/16/2019    Depression     Migraine     Obstructive sleep apnea 9/29/2014    Tobacco abuse       Past Surgical History:   Procedure Laterality Date    VASECTOMY  12/2018     Family History   Problem Relation Age of Onset    Other Mother         hepatitis     Diabetes Mother     High Blood Pressure Mother     Depression Mother     Anxiety Disorder Mother     Cancer Maternal Grandmother     Cancer Maternal Grandfather      Social History     Tobacco Use    Smoking status: Current Some Day Smoker     Packs/day: 0.03     Types: Cigarettes    Smokeless tobacco: Never Used   Substance Use Topics    Alcohol use: Yes     Comment: rare        Allergies   Allergen Reactions    Pcn [Penicillins] Rash       Health Maintenance   Topic Date Due    COVID-19 Vaccine (1) Never done    Pneumococcal 0-64 years Vaccine (1 of 2 - PPSV23) Never done    Depression Screen  03/21/2023    DTaP/Tdap/Td vaccine (2 - Td or Tdap) 08/26/2023    Flu vaccine  Completed    Hepatitis A vaccine  Aged Out    Hepatitis B vaccine  Aged Out    Hib vaccine  Aged Out  Meningococcal (ACWY) vaccine  Aged Out    Varicella vaccine  Discontinued    Hepatitis C screen  Discontinued    HIV screen  Discontinued       Subjective:      Review of Systems   Constitutional: Negative for fatigue and fever. HENT: Positive for dental problem. Negative for congestion, hearing loss, rhinorrhea, sinus pressure, sinus pain, sore throat, tinnitus, trouble swallowing and voice change. Respiratory: Negative. Cardiovascular: Negative. Musculoskeletal: Negative for arthralgias and myalgias. Skin: Negative. Neurological: Negative for dizziness, facial asymmetry, light-headedness, numbness and headaches. Objective:      /70   Pulse 92   Temp 97.8 °F (36.6 °C) (Oral)   Resp 14   Ht 5' 10\" (1.778 m)   Wt 180 lb 6.4 oz (81.8 kg)   SpO2 99%   BMI 25.88 kg/m²      Physical Exam  Vitals and nursing note reviewed. Constitutional:       Appearance: He is not ill-appearing. HENT:      Mouth/Throat:      Mouth: Mucous membranes are moist.      Dentition: Abnormal dentition. Does not have dentures. Dental caries present. No gingival swelling, dental abscesses or gum lesions. Tongue: No lesions. Tongue does not deviate from midline. Comments: Dental decay noted, no fractured teeth, no oral abscesses or gum or gingival swelling. Pain with palpation of left preauricular area no popping or grinding with opening or closing of jaw     Left aneterior cervical lymphadenopathy has resolved   No Pain with palpation of left parotid gland area,    Cardiovascular:      Rate and Rhythm: Regular rhythm. Pulses: Normal pulses. Heart sounds: Normal heart sounds. No murmur heard. Pulmonary:      Effort: Pulmonary effort is normal. No respiratory distress. Breath sounds: Normal breath sounds. Musculoskeletal:         General: Normal range of motion. Skin:     General: Skin is warm and dry. Neurological:      Mental Status: He is alert. Assessment/Plan:         Encounter Diagnoses   Name Primary?  Pain, dental Yes    Dental decay    contact dentist  S&S not consistent with TMJ syndrome  likely fractured tooth  Pt in agreement with plan   Return if symptoms worsen or fail to improve. Reccommended tobaccocessation options including pharmacologic methods, counseled great than 3 minutesduring this visit:  Yes[]  No  []       Patient given educational materials -see patient instructions. Discussed use, benefit, and side effects of prescribedmedications. All patient questions answered. Pt voiced understanding. Reviewedhealth maintenance. Instructed to continue current medications, diet and exercise. Patient agreed with treatment plan. Follow up as directed.        Electronicallysigned by ROJELIO Mancilla CNP on 4/7/2022 at 5:09 PM

## 2022-05-06 DIAGNOSIS — R45.86 MOOD SWING: ICD-10-CM

## 2022-05-06 DIAGNOSIS — F41.9 ANXIETY: ICD-10-CM

## 2022-05-09 RX ORDER — LAMOTRIGINE 25 MG/1
TABLET ORAL
Qty: 120 TABLET | Refills: 0 | Status: SHIPPED | OUTPATIENT
Start: 2022-05-09 | End: 2022-07-18

## 2022-06-07 DIAGNOSIS — G43.009 MIGRAINE WITHOUT AURA AND WITHOUT STATUS MIGRAINOSUS, NOT INTRACTABLE: ICD-10-CM

## 2022-06-07 DIAGNOSIS — F41.9 ANXIETY: ICD-10-CM

## 2022-06-07 RX ORDER — AMITRIPTYLINE HYDROCHLORIDE 100 MG/1
100 TABLET, FILM COATED ORAL NIGHTLY
Qty: 90 TABLET | Refills: 3 | Status: SHIPPED | OUTPATIENT
Start: 2022-06-07

## 2022-06-30 DIAGNOSIS — R00.0 SINUS TACHYCARDIA SEEN ON CARDIAC MONITOR: ICD-10-CM

## 2022-06-30 NOTE — TELEPHONE ENCOUNTER
----- Message from Geovani Roman sent at 6/30/2022 10:02 AM EDT -----  Subject: Refill Request    QUESTIONS  Name of Medication? metoprolol tartrate (LOPRESSOR) 25 MG tablet  Patient-reported dosage and instructions? patient takes one pill twice a   day   How many days do you have left? 0  Preferred Pharmacy? [address] phone number (if available)? [phone]  ---------------------------------------------------------------------------  --------------  CALL BACK INFO  What is the best way for the office to contact you? OK to leave message on   voicemail  Preferred Call Back Phone Number? [phone]  ---------------------------------------------------------------------------  --------------  SCRIPT ANSWERS  Relationship to Patient?  Self

## 2022-07-15 DIAGNOSIS — F41.9 ANXIETY: ICD-10-CM

## 2022-07-15 DIAGNOSIS — R45.86 MOOD SWING: ICD-10-CM

## 2022-07-18 RX ORDER — LAMOTRIGINE 25 MG/1
TABLET ORAL
Qty: 120 TABLET | Refills: 0 | Status: SHIPPED | OUTPATIENT
Start: 2022-07-18 | End: 2022-09-28 | Stop reason: SDUPTHER

## 2022-09-28 DIAGNOSIS — R45.86 MOOD SWING: ICD-10-CM

## 2022-09-28 DIAGNOSIS — F41.9 ANXIETY: ICD-10-CM

## 2022-09-28 RX ORDER — LAMOTRIGINE 25 MG/1
TABLET ORAL
Qty: 120 TABLET | Refills: 0 | Status: SHIPPED | OUTPATIENT
Start: 2022-09-28

## 2022-09-28 NOTE — TELEPHONE ENCOUNTER
Recent Visits  Date Type Provider Dept   04/07/22 Office Visit Johnny Dias, APRN - CNP [facility]   03/21/22 Office Visit oJhnny Dias, APRN - CNP [facility]   11/22/21 Office Visit Johnny Dias, APRN - [facility]NP [facility]   08/30/21 Office Visit Johnny Dias, APRN - [facility]NP [facility]   08/09/21 Office Visit Johnny Dias, APRN - [facility]   05/24/21 Office Visit Johnny Dias, APRN - [facility]   05/20/21 Office Visit Johnny Dias, APRN - [facility]   Showing recent visits within past 540 days with a meds authorizing provider and meeting all other requirements  Future Appointments  No visits were found meeting these conditions. Showing future appointments within next 150 days with a meds authorizing provider and meeting all other requirements    No future appointments.

## 2022-12-12 DIAGNOSIS — F41.9 ANXIETY: ICD-10-CM

## 2022-12-12 DIAGNOSIS — R45.86 MOOD SWING: ICD-10-CM

## 2022-12-12 RX ORDER — LAMOTRIGINE 25 MG/1
TABLET ORAL
Qty: 120 TABLET | Refills: 0 | Status: SHIPPED | OUTPATIENT
Start: 2022-12-12

## 2023-01-08 DIAGNOSIS — R00.0 SINUS TACHYCARDIA SEEN ON CARDIAC MONITOR: ICD-10-CM

## 2023-01-09 NOTE — TELEPHONE ENCOUNTER
Recent Visits  Date Type Provider Dept   04/07/22 Office Visit ROJELIO Silva - CNP [facility]   03/21/22 Office Visit Johnny Dias, APRN - [facility]NP [facility]   11/22/21 Office Visit Johnny Dias, APRN - [facility]   08/30/21 Office Visit Johnny Dias, APRN - [facility]NP [facility]   08/09/21 Office Visit ROJELIO Silva - CNP [facility]   Showing recent visits within past 540 days with a meds authorizing provider and meeting all other requirements  Future Appointments  No visits were found meeting these conditions. Showing future appointments within next 150 days with a meds authorizing provider and meeting all other requirements    No future appointments.

## 2023-01-16 DIAGNOSIS — F33.41 RECURRENT MAJOR DEPRESSIVE DISORDER, IN PARTIAL REMISSION (HCC): ICD-10-CM

## 2023-01-16 DIAGNOSIS — F41.9 ANXIETY: ICD-10-CM

## 2023-01-16 DIAGNOSIS — R45.86 MOOD SWING: ICD-10-CM

## 2023-01-16 RX ORDER — BUPROPION HYDROCHLORIDE 150 MG/1
150 TABLET ORAL EVERY MORNING
Qty: 90 TABLET | Refills: 3 | Status: SHIPPED | OUTPATIENT
Start: 2023-01-16

## 2023-01-16 RX ORDER — LAMOTRIGINE 25 MG/1
TABLET ORAL
Qty: 180 TABLET | Refills: 0 | Status: SHIPPED | OUTPATIENT
Start: 2023-01-16

## 2023-01-16 NOTE — TELEPHONE ENCOUNTER
Recent Visits  Date Type Provider Dept   04/07/22 Office Visit Kareem Bruner, ROJELIO - CNP [facility]   03/21/22 Office Visit Kareem Bruner, ROJELIO - CNP [facility]   11/22/21 Office Visit Kareem Bruner, ROJELIO - CNP [facility]   08/30/21 Office Visit Kareem Bruner, ROJELIO - CNP [facility]   08/09/21 Office Visit Kareem Bruner, ROJELIO - CNP [facility]   Showing recent visits within past 540 days with a meds authorizing provider and meeting all other requirements  Future Appointments  No visits were found meeting these conditions.  Showing future appointments within next 150 days with a meds authorizing provider and meeting all other requirements    No future appointments.

## 2023-01-16 NOTE — TELEPHONE ENCOUNTER
Recent Visits  Date Type Provider Dept   04/07/22 Office Visit ROJELIO Gann CNP [facility]   03/21/22 Office Visit ROJELIO Gann CNP [facility]   11/22/21 Office Visit ROJELIO Gann CNP [facility]   08/30/21 Office Visit ROJELIO Gann CNP [facility]   08/09/21 Office Visit ROJELIO Gann CNP [facility]   Showing recent visits within past 540 days with a meds authorizing provider and meeting all other requirements  Future Appointments  No visits were found meeting these conditions. Showing future appointments within next 150 days with a meds authorizing provider and meeting all other requirements    No future appointments.

## 2023-02-17 DIAGNOSIS — K21.9 GASTROESOPHAGEAL REFLUX DISEASE, UNSPECIFIED WHETHER ESOPHAGITIS PRESENT: ICD-10-CM

## 2023-02-20 RX ORDER — OMEPRAZOLE 20 MG/1
20 CAPSULE, DELAYED RELEASE ORAL DAILY
Qty: 90 CAPSULE | Refills: 3 | Status: SHIPPED | OUTPATIENT
Start: 2023-02-20

## 2023-05-30 DIAGNOSIS — F41.9 ANXIETY: ICD-10-CM

## 2023-05-30 DIAGNOSIS — R45.86 MOOD SWING: ICD-10-CM

## 2023-05-31 ENCOUNTER — TELEPHONE (OUTPATIENT)
Dept: FAMILY MEDICINE CLINIC | Age: 38
End: 2023-05-31

## 2023-05-31 RX ORDER — LAMOTRIGINE 25 MG/1
TABLET ORAL
Qty: 60 TABLET | Refills: 0 | Status: SHIPPED | OUTPATIENT
Start: 2023-05-31 | End: 2023-06-26 | Stop reason: SDUPTHER

## 2023-05-31 NOTE — TELEPHONE ENCOUNTER
Let pt know I did give him a 30 day supple of lamictal but he has not been seen since 4/22 and needs to make an appt for more refills thanks

## 2023-06-22 DIAGNOSIS — F41.9 ANXIETY: ICD-10-CM

## 2023-06-22 DIAGNOSIS — R45.86 MOOD SWING: ICD-10-CM

## 2023-06-22 RX ORDER — LAMOTRIGINE 25 MG/1
TABLET ORAL
Qty: 60 TABLET | Refills: 0 | OUTPATIENT
Start: 2023-06-22

## 2023-06-22 NOTE — TELEPHONE ENCOUNTER
Recent Visits  Date Type Provider Dept   04/07/22 Office Visit ROJELIO Boyle CNP [facility]   03/21/22 Office Visit ROJELIO Boyle CNP [facility]   Showing recent visits within past 540 days with a meds authorizing provider and meeting all other requirements  Future Appointments  No visits were found meeting these conditions. Showing future appointments within next 150 days with a meds authorizing provider and meeting all other requirements   No future appointments.

## 2023-06-26 ENCOUNTER — OFFICE VISIT (OUTPATIENT)
Dept: FAMILY MEDICINE CLINIC | Age: 38
End: 2023-06-26
Payer: COMMERCIAL

## 2023-06-26 VITALS
RESPIRATION RATE: 12 BRPM | OXYGEN SATURATION: 94 % | DIASTOLIC BLOOD PRESSURE: 78 MMHG | SYSTOLIC BLOOD PRESSURE: 118 MMHG | HEIGHT: 70 IN | WEIGHT: 174.4 LBS | TEMPERATURE: 97.8 F | BODY MASS INDEX: 24.97 KG/M2 | HEART RATE: 84 BPM

## 2023-06-26 DIAGNOSIS — R00.0 SINUS TACHYCARDIA SEEN ON CARDIAC MONITOR: ICD-10-CM

## 2023-06-26 DIAGNOSIS — G43.009 MIGRAINE WITHOUT AURA AND WITHOUT STATUS MIGRAINOSUS, NOT INTRACTABLE: ICD-10-CM

## 2023-06-26 DIAGNOSIS — K21.9 GASTROESOPHAGEAL REFLUX DISEASE, UNSPECIFIED WHETHER ESOPHAGITIS PRESENT: ICD-10-CM

## 2023-06-26 DIAGNOSIS — R45.86 MOOD SWING: ICD-10-CM

## 2023-06-26 DIAGNOSIS — R06.83 SNORES: ICD-10-CM

## 2023-06-26 DIAGNOSIS — F41.9 ANXIETY: Primary | ICD-10-CM

## 2023-06-26 PROCEDURE — 99214 OFFICE O/P EST MOD 30 MIN: CPT | Performed by: NURSE PRACTITIONER

## 2023-06-26 RX ORDER — OMEPRAZOLE 20 MG/1
20 CAPSULE, DELAYED RELEASE ORAL DAILY
Qty: 90 CAPSULE | Refills: 4 | Status: SHIPPED | OUTPATIENT
Start: 2023-06-26

## 2023-06-26 RX ORDER — AMITRIPTYLINE HYDROCHLORIDE 100 MG/1
100 TABLET, FILM COATED ORAL NIGHTLY
Qty: 90 TABLET | Refills: 4 | Status: SHIPPED | OUTPATIENT
Start: 2023-06-26

## 2023-06-26 RX ORDER — LAMOTRIGINE 25 MG/1
TABLET ORAL
Qty: 180 TABLET | Refills: 4 | Status: SHIPPED | OUTPATIENT
Start: 2023-06-26

## 2023-06-26 SDOH — ECONOMIC STABILITY: FOOD INSECURITY: WITHIN THE PAST 12 MONTHS, THE FOOD YOU BOUGHT JUST DIDN'T LAST AND YOU DIDN'T HAVE MONEY TO GET MORE.: NEVER TRUE

## 2023-06-26 SDOH — ECONOMIC STABILITY: FOOD INSECURITY: WITHIN THE PAST 12 MONTHS, YOU WORRIED THAT YOUR FOOD WOULD RUN OUT BEFORE YOU GOT MONEY TO BUY MORE.: NEVER TRUE

## 2023-06-26 SDOH — ECONOMIC STABILITY: INCOME INSECURITY: HOW HARD IS IT FOR YOU TO PAY FOR THE VERY BASICS LIKE FOOD, HOUSING, MEDICAL CARE, AND HEATING?: NOT HARD AT ALL

## 2023-06-26 ASSESSMENT — ENCOUNTER SYMPTOMS
GASTROINTESTINAL NEGATIVE: 1
RESPIRATORY NEGATIVE: 1

## 2023-06-26 ASSESSMENT — PATIENT HEALTH QUESTIONNAIRE - PHQ9
SUM OF ALL RESPONSES TO PHQ QUESTIONS 1-9: 0
1. LITTLE INTEREST OR PLEASURE IN DOING THINGS: 0
SUM OF ALL RESPONSES TO PHQ QUESTIONS 1-9: 0
2. FEELING DOWN, DEPRESSED OR HOPELESS: 0
SUM OF ALL RESPONSES TO PHQ9 QUESTIONS 1 & 2: 0
SUM OF ALL RESPONSES TO PHQ QUESTIONS 1-9: 0
SUM OF ALL RESPONSES TO PHQ QUESTIONS 1-9: 0

## 2023-11-19 DIAGNOSIS — K21.9 GASTROESOPHAGEAL REFLUX DISEASE, UNSPECIFIED WHETHER ESOPHAGITIS PRESENT: ICD-10-CM

## 2023-11-19 DIAGNOSIS — F41.9 ANXIETY: ICD-10-CM

## 2023-11-19 DIAGNOSIS — F33.41 RECURRENT MAJOR DEPRESSIVE DISORDER, IN PARTIAL REMISSION (HCC): ICD-10-CM

## 2023-11-19 DIAGNOSIS — R00.0 SINUS TACHYCARDIA SEEN ON CARDIAC MONITOR: ICD-10-CM

## 2023-11-19 DIAGNOSIS — G43.009 MIGRAINE WITHOUT AURA AND WITHOUT STATUS MIGRAINOSUS, NOT INTRACTABLE: ICD-10-CM

## 2023-11-20 RX ORDER — OMEPRAZOLE 20 MG/1
20 CAPSULE, DELAYED RELEASE ORAL DAILY
Qty: 90 CAPSULE | Refills: 4 | OUTPATIENT
Start: 2023-11-20

## 2023-11-20 RX ORDER — AMITRIPTYLINE HYDROCHLORIDE 100 MG/1
100 TABLET ORAL NIGHTLY
Qty: 90 TABLET | Refills: 4 | OUTPATIENT
Start: 2023-11-20

## 2023-11-20 RX ORDER — BUPROPION HYDROCHLORIDE 150 MG/1
150 TABLET ORAL EVERY MORNING
Qty: 90 TABLET | Refills: 3 | OUTPATIENT
Start: 2023-11-20

## 2024-01-09 DIAGNOSIS — F33.41 RECURRENT MAJOR DEPRESSIVE DISORDER, IN PARTIAL REMISSION (HCC): ICD-10-CM

## 2024-01-09 RX ORDER — BUPROPION HYDROCHLORIDE 150 MG/1
150 TABLET ORAL EVERY MORNING
Qty: 90 TABLET | Refills: 0 | Status: SHIPPED | OUTPATIENT
Start: 2024-01-09

## 2024-01-09 NOTE — TELEPHONE ENCOUNTER
Future Appointments   Date Time Provider Department Center   7/9/2024  4:20 PM Kareem Bruner, ROJELIO - C[facility] [place]

## 2024-01-12 DIAGNOSIS — R00.0 SINUS TACHYCARDIA SEEN ON CARDIAC MONITOR: ICD-10-CM

## 2024-01-12 NOTE — TELEPHONE ENCOUNTER
Recent Visits  Date Type Provider Dept   06/26/23 Office Visit Kareem Bruner, APRN - CNP [facility]   Showing recent visits within past 540 days with a meds authorizing provider and meeting all other requirements  Future Appointments  No visits were found meeting these conditions.  Showing future appointments within next 150 days with a meds authorizing provider and meeting all other requirements

## 2024-02-27 DIAGNOSIS — K21.9 GASTROESOPHAGEAL REFLUX DISEASE, UNSPECIFIED WHETHER ESOPHAGITIS PRESENT: ICD-10-CM

## 2024-02-27 RX ORDER — OMEPRAZOLE 20 MG/1
20 CAPSULE, DELAYED RELEASE ORAL DAILY
Qty: 30 CAPSULE | Refills: 0 | OUTPATIENT
Start: 2024-02-27

## 2024-02-29 DIAGNOSIS — K21.9 GASTROESOPHAGEAL REFLUX DISEASE, UNSPECIFIED WHETHER ESOPHAGITIS PRESENT: ICD-10-CM

## 2024-02-29 RX ORDER — OMEPRAZOLE 20 MG/1
20 CAPSULE, DELAYED RELEASE ORAL DAILY
Qty: 90 CAPSULE | Refills: 4 | Status: SHIPPED | OUTPATIENT
Start: 2024-02-29

## 2024-02-29 RX ORDER — OMEPRAZOLE 20 MG/1
20 CAPSULE, DELAYED RELEASE ORAL DAILY
Qty: 30 CAPSULE | Refills: 0 | OUTPATIENT
Start: 2024-02-29

## 2024-03-03 ENCOUNTER — HOSPITAL ENCOUNTER (EMERGENCY)
Age: 39
Discharge: HOME | End: 2024-03-03
Payer: COMMERCIAL

## 2024-03-03 VITALS
BODY MASS INDEX: 25.83 KG/M2 | TEMPERATURE: 98.9 F | RESPIRATION RATE: 20 BRPM | OXYGEN SATURATION: 97 % | HEART RATE: 90 BPM | DIASTOLIC BLOOD PRESSURE: 82 MMHG | SYSTOLIC BLOOD PRESSURE: 116 MMHG | WEIGHT: 180 LBS

## 2024-03-03 DIAGNOSIS — B37.0 THRUSH: Primary | ICD-10-CM

## 2024-03-03 LAB
FLUAV AG SPEC QL: NEGATIVE
FLUBV AG SPEC QL: NEGATIVE
S PYO AG THROAT QL: NEGATIVE

## 2024-03-03 PROCEDURE — 87804 INFLUENZA ASSAY W/OPTIC: CPT

## 2024-03-03 PROCEDURE — 87651 STREP A DNA AMP PROBE: CPT

## 2024-03-03 PROCEDURE — 99213 OFFICE O/P EST LOW 20 MIN: CPT

## 2024-03-03 PROCEDURE — 99214 OFFICE O/P EST MOD 30 MIN: CPT

## 2024-03-03 ASSESSMENT — PAIN DESCRIPTION - FREQUENCY: FREQUENCY: CONTINUOUS

## 2024-03-03 ASSESSMENT — PAIN - FUNCTIONAL ASSESSMENT
PAIN_FUNCTIONAL_ASSESSMENT: ACTIVITIES ARE NOT PREVENTED
PAIN_FUNCTIONAL_ASSESSMENT: 0-10

## 2024-03-03 ASSESSMENT — PAIN DESCRIPTION - DESCRIPTORS: DESCRIPTORS: SORE

## 2024-03-03 ASSESSMENT — PAIN DESCRIPTION - LOCATION: LOCATION: THROAT

## 2024-03-03 ASSESSMENT — PAIN DESCRIPTION - PAIN TYPE: TYPE: ACUTE PAIN

## 2024-03-03 ASSESSMENT — PAIN SCALES - GENERAL: PAINLEVEL_OUTOF10: 7

## 2024-03-03 NOTE — ED TRIAGE NOTES
Patient to room with c/o sore throat and nausea beginning two days ago. Strep and flu swabs obtained.

## 2024-03-03 NOTE — ED PROVIDER NOTES
[facility] URGENT CARE  Urgent Care Encounter      CHIEF COMPLAINT       Chief Complaint   Patient presents with    Sore Throat     Nausea         Nurses Notes reviewed and I agree except as noted in the HPI.  HISTORY OF PRESENT ILLNESS   Nehemiah Waters is a 39 y.o. male who presents to urgent care with complaints of sore throat and nausea.  Patient reports symptoms began 2 days ago.  Patient denies fevers, chest pain, shortness of breath.  Patient denies being around anyone sick recently.  Patient denies taking any over-the-counter medications for his symptoms.    REVIEW OF SYSTEMS     Review of Systems   Constitutional:  Negative for fatigue and fever.   HENT:  Positive for sore throat.    Respiratory:  Negative for cough, shortness of breath and wheezing.    Cardiovascular:  Negative for chest pain.   Gastrointestinal:  Negative for abdominal pain, diarrhea and vomiting.   Musculoskeletal:  Negative for arthralgias.   Neurological:  Negative for dizziness, seizures and numbness.       PAST MEDICAL HISTORY         Diagnosis Date    Anxiety     Asthma     when he was younger he was told he had asthma     Concussion 01/16/2019    Depression     Migraine     Obstructive sleep apnea 9/29/2014    Tobacco abuse        SURGICAL HISTORY     Patient  has a past surgical history that includes Vasectomy (12/2018).    CURRENT MEDICATIONS       Previous Medications    ACETAMINOPHEN (APAP EXTRA STRENGTH) 500 MG TABLET    Take 2 tablets by mouth every 8 hours as needed for Pain    AMITRIPTYLINE (ELAVIL) 100 MG TABLET    Take 1 tablet by mouth nightly    BUPROPION (WELLBUTRIN XL) 150 MG EXTENDED RELEASE TABLET    TAKE 1 TABLET BY MOUTH ONCE DAILY IN THE MORNING    DICLOFENAC SODIUM (VOLTAREN) 1 % GEL    Apply 2 g topically 2 times daily    IBUPROFEN (ADVIL;MOTRIN) 600 MG TABLET    Take 1 tablet by mouth every 8 hours as needed for Pain    LAMOTRIGINE (LAMICTAL) 25 MG TABLET    2 tab po daily    METOPROLOL TARTRATE

## 2024-04-11 DIAGNOSIS — F33.41 RECURRENT MAJOR DEPRESSIVE DISORDER, IN PARTIAL REMISSION (HCC): ICD-10-CM

## 2024-04-11 RX ORDER — BUPROPION HYDROCHLORIDE 150 MG/1
150 TABLET ORAL EVERY MORNING
Qty: 90 TABLET | Refills: 0 | Status: SHIPPED | OUTPATIENT
Start: 2024-04-11

## 2024-04-11 NOTE — TELEPHONE ENCOUNTER
Future Appointments   Date Time Provider Department Center   7/9/2024  4:20 PM Kareem Bruner, ROJELIO - CNP [facility] [place]

## 2024-07-09 DIAGNOSIS — G43.009 MIGRAINE WITHOUT AURA AND WITHOUT STATUS MIGRAINOSUS, NOT INTRACTABLE: ICD-10-CM

## 2024-07-09 DIAGNOSIS — F41.9 ANXIETY: ICD-10-CM

## 2024-07-09 DIAGNOSIS — R45.86 MOOD SWING: ICD-10-CM

## 2024-07-09 RX ORDER — AMITRIPTYLINE HYDROCHLORIDE 100 MG/1
100 TABLET ORAL NIGHTLY
Qty: 30 TABLET | Refills: 0 | OUTPATIENT
Start: 2024-07-09

## 2024-07-09 RX ORDER — LAMOTRIGINE 25 MG/1
TABLET ORAL
Qty: 60 TABLET | Refills: 0 | OUTPATIENT
Start: 2024-07-09

## 2024-07-09 NOTE — TELEPHONE ENCOUNTER
Future Appointments   Date Time Provider Department Center   7/9/2024  4:20 PM Kareem Bruner, ROJELIO - CNP [facility] [place]

## 2024-07-11 ENCOUNTER — OFFICE VISIT (OUTPATIENT)
Dept: FAMILY MEDICINE CLINIC | Age: 39
End: 2024-07-11

## 2024-07-11 VITALS
HEIGHT: 70 IN | BODY MASS INDEX: 25.68 KG/M2 | DIASTOLIC BLOOD PRESSURE: 82 MMHG | HEART RATE: 74 BPM | SYSTOLIC BLOOD PRESSURE: 128 MMHG | RESPIRATION RATE: 16 BRPM | OXYGEN SATURATION: 98 % | WEIGHT: 179.4 LBS | TEMPERATURE: 98.2 F

## 2024-07-11 DIAGNOSIS — F33.41 RECURRENT MAJOR DEPRESSIVE DISORDER, IN PARTIAL REMISSION (HCC): ICD-10-CM

## 2024-07-11 DIAGNOSIS — F41.9 ANXIETY: ICD-10-CM

## 2024-07-11 DIAGNOSIS — I10 PRIMARY HYPERTENSION: ICD-10-CM

## 2024-07-11 DIAGNOSIS — Z00.01 ENCOUNTER FOR GENERAL ADULT MEDICAL EXAMINATION WITH ABNORMAL FINDINGS: ICD-10-CM

## 2024-07-11 DIAGNOSIS — Z00.00 ENCOUNTER FOR WELL ADULT EXAM WITHOUT ABNORMAL FINDINGS: Primary | ICD-10-CM

## 2024-07-11 DIAGNOSIS — R45.86 MOOD SWING: ICD-10-CM

## 2024-07-11 DIAGNOSIS — Z82.41 FAMILY HISTORY OF SUDDEN CARDIAC DEATH (SCD): ICD-10-CM

## 2024-07-11 DIAGNOSIS — G47.33 OBSTRUCTIVE SLEEP APNEA: ICD-10-CM

## 2024-07-11 DIAGNOSIS — Z86.69 HISTORY OF MIGRAINE HEADACHES: ICD-10-CM

## 2024-07-11 DIAGNOSIS — K21.00 GASTROESOPHAGEAL REFLUX DISEASE WITH ESOPHAGITIS WITHOUT HEMORRHAGE: ICD-10-CM

## 2024-07-11 DIAGNOSIS — G43.009 MIGRAINE WITHOUT AURA AND WITHOUT STATUS MIGRAINOSUS, NOT INTRACTABLE: ICD-10-CM

## 2024-07-11 PROBLEM — G40.89 OTHER SEIZURES (HCC): Status: ACTIVE | Noted: 2024-07-11

## 2024-07-11 PROBLEM — G40.89 OTHER SEIZURES (HCC): Status: RESOLVED | Noted: 2024-07-11 | Resolved: 2024-07-11

## 2024-07-11 PROBLEM — R56.9 UNSPECIFIED CONVULSIONS (HCC): Status: ACTIVE | Noted: 2024-07-11

## 2024-07-11 PROBLEM — R56.9 UNSPECIFIED CONVULSIONS (HCC): Status: RESOLVED | Noted: 2024-07-11 | Resolved: 2024-07-11

## 2024-07-11 RX ORDER — LAMOTRIGINE 25 MG/1
TABLET ORAL
Qty: 180 TABLET | Refills: 4 | Status: SHIPPED | OUTPATIENT
Start: 2024-07-11

## 2024-07-11 RX ORDER — ATORVASTATIN CALCIUM 10 MG/1
10 TABLET, FILM COATED ORAL DAILY
Qty: 90 TABLET | Refills: 4 | Status: SHIPPED | OUTPATIENT
Start: 2024-07-11

## 2024-07-11 RX ORDER — BUPROPION HYDROCHLORIDE 150 MG/1
150 TABLET ORAL EVERY MORNING
Qty: 90 TABLET | Refills: 0 | Status: SHIPPED | OUTPATIENT
Start: 2024-07-11 | End: 2024-07-11 | Stop reason: SDUPTHER

## 2024-07-11 RX ORDER — SUMATRIPTAN 25 MG/1
25 TABLET, FILM COATED ORAL
Qty: 9 TABLET | Refills: 5 | Status: SHIPPED | OUTPATIENT
Start: 2024-07-11 | End: 2024-07-11

## 2024-07-11 RX ORDER — OMEPRAZOLE 20 MG/1
20 CAPSULE, DELAYED RELEASE ORAL DAILY
Qty: 90 CAPSULE | Refills: 4 | Status: SHIPPED | OUTPATIENT
Start: 2024-07-11

## 2024-07-11 RX ORDER — BUPROPION HYDROCHLORIDE 150 MG/1
150 TABLET ORAL EVERY MORNING
Qty: 90 TABLET | Refills: 0 | Status: SHIPPED | OUTPATIENT
Start: 2024-07-11

## 2024-07-11 RX ORDER — AMITRIPTYLINE HYDROCHLORIDE 100 MG/1
100 TABLET ORAL NIGHTLY
Qty: 90 TABLET | Refills: 4 | Status: SHIPPED | OUTPATIENT
Start: 2024-07-11

## 2024-07-11 SDOH — ECONOMIC STABILITY: FOOD INSECURITY: WITHIN THE PAST 12 MONTHS, THE FOOD YOU BOUGHT JUST DIDN'T LAST AND YOU DIDN'T HAVE MONEY TO GET MORE.: NEVER TRUE

## 2024-07-11 SDOH — ECONOMIC STABILITY: FOOD INSECURITY: WITHIN THE PAST 12 MONTHS, YOU WORRIED THAT YOUR FOOD WOULD RUN OUT BEFORE YOU GOT MONEY TO BUY MORE.: NEVER TRUE

## 2024-07-11 SDOH — ECONOMIC STABILITY: INCOME INSECURITY: HOW HARD IS IT FOR YOU TO PAY FOR THE VERY BASICS LIKE FOOD, HOUSING, MEDICAL CARE, AND HEATING?: NOT HARD AT ALL

## 2024-07-11 ASSESSMENT — PATIENT HEALTH QUESTIONNAIRE - PHQ9
SUM OF ALL RESPONSES TO PHQ QUESTIONS 1-9: 0
1. LITTLE INTEREST OR PLEASURE IN DOING THINGS: NOT AT ALL
2. FEELING DOWN, DEPRESSED OR HOPELESS: NOT AT ALL
SUM OF ALL RESPONSES TO PHQ9 QUESTIONS 1 & 2: 0

## 2024-07-11 ASSESSMENT — ENCOUNTER SYMPTOMS
PHOTOPHOBIA: 0
RESPIRATORY NEGATIVE: 1
GASTROINTESTINAL NEGATIVE: 1

## 2024-07-11 NOTE — PATIENT INSTRUCTIONS
hands, brush your teeth twice a day, and wear a seat belt in the car.   Where can you learn more?  Go to https://www.7Summits.net/patientEd and enter P072 to learn more about \"Well Visit, Ages 18 to 65: Care Instructions.\"  Current as of: August 6, 2023  Content Version: 14.1  © 4395-9014 [employer], [employer].   Care instructions adapted under license by [employer]. If you have questions about a medical condition or this instruction, always ask your healthcare professional. Health[employer], [employer] disclaims any warranty or liability for your use of this information.

## 2024-07-11 NOTE — TELEPHONE ENCOUNTER
Future Appointments   Date Time Provider Department Center   7/11/2024  4:40 PM Kareem Bruner, RJOELIO - CNP [facility] [place]

## 2024-07-11 NOTE — PROGRESS NOTES
Internal Medicine Interval Note  Note Author: Thom Rodriguez M.D.     Name Karin Briseno     1983   Age/Sex 35 y.o. female   MRN [id number]   Code Status Full Code     After 5PM or if no immediate response to page, please call for cross-coverage  Attending/Team: Dr. Steele/ Red Team See Patient List for primary contact information  Call [phone] to page    1st Call - Day Intern (R1):   Dr Rodriguez 2nd Call - Day Sr. Resident (R2/R3):   Dr. Veras         Reason for interval visit  (Principal Problem)     Pre-clampsia and Hypertension      Interval Problem Daily Status Update  (24 hours, problem oriented, brief subjective history, new lab/imaging data pertinent to that problem)     - No acute events overnight, vital signs stable, Still requires 2 L O2. Patient and baby doing well without complaints. States she feels dizzy when standing.  -  was present.  - BP was stable overnight 151-130/77-65.  Patient was given Magnesium and labetalol 100 mg daily  - Transthoracic Echocardiogram pending to rule out Pulmonary HTN or Dilated Cardiomyopathy of pregnancy.  - Denies CP, SOB, abdominal pain, Headaches or changes in vision.  No focal neurological deficits are present.       ROS     Constitutional: Patient has had no fevers or chills or fatigue. Patient has has no significant weight change.  Eyes: Patient had no visual changes or vision loss.  ENT: Patient denies any sore throat or allergic rhinitis.  Respiratory: Patient has had no cough or sputum production. Also, no hemoptysis.  Cardiovascular: No chest pain. No SOB, orthopnea or PND. Intermittent palpitations +  GI: Patient denies any nausea, vomiting, or diarrhea. Patient has had no hematemesis or melena.  : Patient denies any urinary urgency, frequency, or dysuria. Patient has noted no hematuria. Denies h/o polycystic kidneys  Orthopedic: Patient has no particular problem with arthritis. Patient is able to walk and exercise without  difficulty.  Neurologic: Patient has had no focal numbness or weakness. HEADACHE +  Psychiatric: Patient denies any difficulty with anxiety or depression.  Endocrine: Patient denies any history of thyroid disorder or diabetes. Patient denies any heat or cold intolerance. In addition, patient denies any polydipsia or polyuria.  Hematologic: Patient has had no easy bruising or bleeding. Patient denies any history of anemia.  Skin: Patient denies any unusual rashes or skin lesions.  Allergic/immunologic: Patient denies any difficulty with hayfever or other environmental allergens. Patient denies any food allergies.    Disposition/Barriers to discharge:   None    Consultants/Specialty  None    PCP: Adela Morin      Quality Measures  Quality-Core Measures   Reviewed items::  EKG reviewed, Labs reviewed, Medications reviewed and Radiology images reviewed  Flynn catheter::  No Flynn  DVT prophylaxis - mechanical:  SCDs          Physical Exam       Vitals:    03/22/19 1015 03/22/19 1017 03/22/19 1019 03/22/19 1022   BP:       Pulse: 95 88 87 90   Resp:       Temp:       TempSrc:       SpO2:  93%  93%   Weight:       Height:         Body mass index is 41.28 kg/m².    Oxygen Therapy:  Pulse Oximetry: 93 %, O2 (LPM): 2, O2 Delivery: Nasal Cannula    Physical Exam       General: Patient is an alert, morbidly obese female, in no acute distress.  Eyes: PERRLA, EOMI, lids and conjunctiva are clear.  ENT: Mouth- good oral hygiene, oral mucosa is normal.  Neck: No thyromegaly, cervical adenopathy, or jugular venous distention is noted.  Chest: The lungs are clear to auscultation and percussion. No rales, wheezes, or rhonchi are noted. Respiratory effort is normal.  Cardiac: PMI not displaced to palpation. S1 and S2 heard. No splitting. Prominent S2. No S3 or S4 appreciated. No murmurs,clicks or rubs are noted. Carotid, femoral, and pedal pulses are 2+ and symmetrical. No carotid or femoral bruits are appreciated.  Abdomen:  Abdomen is soft and nontender, no hepatosplenomegaly or masses are noted. The abdominal  aorta does not appear to be enlarged. The abdominal aorta is non-palpable. No abdominal bruits are appreciated.  Musculoskeletal: There is no significant kyphoscoliosis present. Muscle strength and tone are normal. Gait is normal.  Extremities: No clubbing, cyanosis, or edema is present.  Skin: Inspection and palpation are noncontributory.  Neurologic: No focal neurologic abnormality noted.  Psychiatric: The patient is alert and oriented x3. Mood and affect are appropriate.       Assessment/Plan     Hypertension affecting pregnancy, third trimester   Assessment & Plan    - Patient 6 days post-partum complicated by gestational HTN and currently admitted for postpartum preclampsia, was noted to be hypoxic this admissionto low 80s   - Multiple magnesium infusions.   - CT Head for headache did not reveal any acute intracranial abnormality. - - She had a CTPA to r/o PE and there was no evidence of sub-massive or massive PE noted on CTPA   - Other differentials include primary pulmonary hypertension versus restrictive lung disease with preserved A-a gradient due to morbid obesity itself, both of these are chronic issues.   - CXR with Minimal left basilar atelectasis.No acute cardiopulmonary findings  PLAN:     - Transthoracic Echocardiogram pending  - Incentive Spirometry  - Low salt diet  - Advise weight loss programs  - Continue management of pre-eclampsia per primary team  - Will setup close follow-up with PFTs and pulmonology consult as out-patient if needed pending Echo read.  - Thank you for this interesting consult.          Disp-90 tablet, R-4Normal  Primary hypertension  Controlled continue current meds  -     atorvastatin (LIPITOR) 10 MG tablet; Take 1 tablet by mouth daily Take 1 tablet at hs, Disp-90 tablet, R-4Normal  Migraine without aura and without status migrainosus, not intractable  Stable continue   -     amitriptyline (ELAVIL) 100 MG tablet; Take 1 tablet by mouth nightly, Disp-90 tablet, R-4Normal  Recurrent major depressive disorder, in partial remission (HCC)  -     buPROPion (WELLBUTRIN XL) 150 MG extended release tablet; Take 1 tablet by mouth every morning, Disp-90 tablet, R-0Normal  Stable   -     History of migraine headaches  -     SUMAtriptan (IMITREX) 25 MG tablet; Take 1 tablet by mouth once as needed for Migraine, Disp-9 tablet, R-5Normal  Encounter for general adult medical examination with abnormal findings      Pt in agreement with plan     Return in about 1 year (around 7/11/2025) for Physical exam .     Personalized Preventive Plan  Current Health Maintenance Status  Immunization History   Administered Date(s) Administered    Influenza A ([id number]) Vaccine PF IM 02/24/2010    Influenza Virus Vaccine 10/28/2020    Influenza, FLUARIX, FLULAVAL, FLUZONE (age 6 mo+) AND AFLURIA, (age 3 y+), PF, 0.5mL 10/21/2019    Influenza, FLUCELVAX, (age 6 mo+), MDCK, PF, 0.5mL 11/22/2021    TDaP, ADACEL (age 10y-64y), BOOSTRIX (age 10y+), IM, 0.5mL 08/26/2013        Health Maintenance Due   Topic Date Due    Hepatitis B vaccine (1 of 3 - 3-dose series) Never done    COVID-19 Vaccine (1) Never done    Pneumococcal 0-64 years Vaccine (1 of 2 - PCV) Never done    Diabetes screen  Never done    Depression Monitoring  06/26/2024     Recommendations for Preventive Services Due: see orders and patient instructions/AVS.

## 2024-09-09 RX ORDER — METOPROLOL TARTRATE 25 MG/1
25 TABLET, FILM COATED ORAL 2 TIMES DAILY
Qty: 180 TABLET | Refills: 1 | Status: SHIPPED | OUTPATIENT
Start: 2024-09-09

## 2024-10-11 DIAGNOSIS — F33.41 RECURRENT MAJOR DEPRESSIVE DISORDER, IN PARTIAL REMISSION (HCC): ICD-10-CM

## 2024-10-11 RX ORDER — BUPROPION HYDROCHLORIDE 150 MG/1
150 TABLET ORAL EVERY MORNING
Qty: 90 TABLET | Refills: 3 | Status: SHIPPED | OUTPATIENT
Start: 2024-10-11

## 2024-10-11 NOTE — TELEPHONE ENCOUNTER
Recent Visits  Date Type Provider Dept   07/11/24 Office Visit Kareem Bruner APRN - CNP [facility]   06/26/23 Office Visit Kareem Bruner APRN - CNP [facility]   Showing recent visits within past 540 days with a meds authorizing provider and meeting all other requirements  Future Appointments  No visits were found meeting these conditions.  Showing future appointments within next 150 days with a meds authorizing provider and meeting all other requirements

## 2024-11-05 ENCOUNTER — PATIENT MESSAGE (OUTPATIENT)
Dept: FAMILY MEDICINE CLINIC | Age: 39
End: 2024-11-05

## 2024-11-05 NOTE — TELEPHONE ENCOUNTER
Future Appointments   Date Time Provider Department Center   11/21/2024  4:20 PM Kareem Bruner APRN - CNP [facility]   7/14/2025  4:40 PM Kareem Bruner APRN - CNP [facility]

## 2024-11-25 ENCOUNTER — OFFICE VISIT (OUTPATIENT)
Dept: FAMILY MEDICINE CLINIC | Age: 39
End: 2024-11-25
Payer: COMMERCIAL

## 2024-11-25 VITALS
RESPIRATION RATE: 16 BRPM | DIASTOLIC BLOOD PRESSURE: 82 MMHG | HEART RATE: 80 BPM | WEIGHT: 184.8 LBS | OXYGEN SATURATION: 99 % | SYSTOLIC BLOOD PRESSURE: 128 MMHG | TEMPERATURE: 97.9 F | BODY MASS INDEX: 26.45 KG/M2 | HEIGHT: 70 IN

## 2024-11-25 DIAGNOSIS — G89.29 CHRONIC NONINTRACTABLE HEADACHE, UNSPECIFIED HEADACHE TYPE: ICD-10-CM

## 2024-11-25 DIAGNOSIS — K59.00 CONSTIPATION, UNSPECIFIED CONSTIPATION TYPE: ICD-10-CM

## 2024-11-25 DIAGNOSIS — G47.33 OBSTRUCTIVE SLEEP APNEA: ICD-10-CM

## 2024-11-25 DIAGNOSIS — R45.86 MOOD SWING: Primary | ICD-10-CM

## 2024-11-25 DIAGNOSIS — K21.00 GASTROESOPHAGEAL REFLUX DISEASE WITH ESOPHAGITIS WITHOUT HEMORRHAGE: ICD-10-CM

## 2024-11-25 DIAGNOSIS — F41.9 ANXIETY: ICD-10-CM

## 2024-11-25 DIAGNOSIS — E78.2 MIXED HYPERLIPIDEMIA: ICD-10-CM

## 2024-11-25 DIAGNOSIS — R51.9 CHRONIC NONINTRACTABLE HEADACHE, UNSPECIFIED HEADACHE TYPE: ICD-10-CM

## 2024-11-25 PROCEDURE — 99214 OFFICE O/P EST MOD 30 MIN: CPT | Performed by: NURSE PRACTITIONER

## 2024-11-25 RX ORDER — LAMOTRIGINE 100 MG/1
TABLET ORAL
Qty: 90 TABLET | Refills: 1 | Status: SHIPPED | OUTPATIENT
Start: 2024-11-25

## 2024-11-25 RX ORDER — POLYETHYLENE GLYCOL 3350 17 G/17G
17 POWDER, FOR SOLUTION ORAL DAILY PRN
Qty: 510 G | Refills: 5 | Status: SHIPPED | OUTPATIENT
Start: 2024-11-25 | End: 2024-12-25

## 2024-11-25 ASSESSMENT — ENCOUNTER SYMPTOMS
RECTAL PAIN: 1
CONSTIPATION: 1
NAUSEA: 0
RESPIRATORY NEGATIVE: 1
VOMITING: 0
DIARRHEA: 0
ABDOMINAL DISTENTION: 0
ABDOMINAL PAIN: 0

## 2024-11-25 NOTE — PROGRESS NOTES
Lab Results   Component Value Date     05/31/2024    K 4.5 05/31/2024     05/31/2024    CO2 23 05/31/2024    BUN 12 05/31/2024    CREATININE 1.04 05/31/2024    GLUCOSE 104 05/31/2024    CALCIUM 9.7 05/31/2024    BILITOT 0.5 01/11/2021    ALKPHOS 69 01/11/2021    AST 12 (L) 01/11/2021    ALT 10 01/11/2021    LABGLOM 93 05/31/2024    AGRATIO 1.7 01/11/2021        Lab Results   Component Value Date    TSH 4.110 01/16/2019      Lab Results   Component Value Date    PSA 1.5 05/31/2024    The 10-year ASCVD risk score (Denise MASON, et al., 2019) is: 13.1%    Values used to calculate the score:      Age: 39 years      Sex: Male      Is Non- : No      Diabetic: No      Tobacco smoker: Yes      Systolic Blood Pressure: 128 mmHg      Is BP treated: No      HDL Cholesterol: 29 mg/dL      Total Cholesterol: 258 mg/dL     Current Outpatient Medications   Medication Sig Dispense Refill    lamoTRIgine (LAMICTAL) 100 MG tablet 2 tab po daily 90 tablet 1    polyethylene glycol (GLYCOLAX) 17 GM/SCOOP powder Take 17 g by mouth daily as needed (prn) 510 g 5    buPROPion (WELLBUTRIN XL) 150 MG extended release tablet TAKE 1 TABLET BY MOUTH ONCE DAILY IN THE MORNING 90 tablet 3    metoprolol tartrate (LOPRESSOR) 25 MG tablet Take 1 tablet by mouth twice daily 180 tablet 1    atorvastatin (LIPITOR) 10 MG tablet Take 1 tablet by mouth daily Take 1 tablet at hs 90 tablet 4    amitriptyline (ELAVIL) 100 MG tablet Take 1 tablet by mouth nightly 90 tablet 4    omeprazole (PRILOSEC) 20 MG delayed release capsule Take 1 capsule by mouth daily 90 capsule 4    SUMAtriptan (IMITREX) 25 MG tablet Take 1 tablet by mouth once as needed for Migraine 9 tablet 5    ondansetron (ZOFRAN) 4 MG tablet Take 1 tablet by mouth 3 times daily as needed for Nausea or Vomiting 30 tablet 0    ibuprofen (ADVIL;MOTRIN) 600 MG tablet Take 1 tablet by mouth every 8 hours as needed for Pain 90 tablet 5    acetaminophen (APAP EXTRA

## 2024-12-24 NOTE — CARE COORDINATION
Ambulatory Care Coordination ED COVID Follow up Call    Challenges to be reviewed by the provider   Additional needs identified to be addressed with provider: No  none                 Encounter was not routed to provider for escalation. Method of communication with provider: phone    Discussed COVID-23 related testing which was: available at this time. Test results were: positive. Patient informed of results, if available? Yes. Current Symptoms: fever, pain or aching joints, sweating and no worsening symptoms    Reviewed New or Changed Meds: patient declined    Do you have what you need at home?  Durable Medical Equipment ordered at discharge: None   Home Health/Outpatient orders at discharge: none   Was patient discharged with a pulse oximeter? No Discussed and confirmed pulse oximeter discharge instructions and when to notify provider or seek emergency care. Patient education provided: Reviewed appropriate site of care based on symptoms and resources available to patient including: PCP, Urgent care clinics and When to call 911. Follow up appointment recommended: yes. If no appointment scheduled, scheduling offered: yes  Future Appointments   Date Time Provider Shivani Pablo   5/23/2022  4:20 PM [place] APRN - [id number] [address] - [address] [facility]       Interventions: Obtained and reviewed discharge summary and/or continuity of care documents  Reviewed discharge instructions, medical action plan and red flags with patient who verbalized understanding. Plan for follow-up call in 5-7 days based on severity of symptoms and risk factors. Plan for next call: symptom management-when to contact PCP office or return to ED for new or worsening symptoms. Provided contact information for future needs.     Deloris Diggs
supervision

## 2025-01-22 DIAGNOSIS — R45.86 MOOD SWING: ICD-10-CM

## 2025-01-22 DIAGNOSIS — F41.9 ANXIETY: ICD-10-CM

## 2025-01-22 DIAGNOSIS — F33.41 RECURRENT MAJOR DEPRESSIVE DISORDER, IN PARTIAL REMISSION (HCC): ICD-10-CM

## 2025-01-22 RX ORDER — LAMOTRIGINE 100 MG/1
TABLET ORAL
Qty: 180 TABLET | Refills: 4 | Status: SHIPPED | OUTPATIENT
Start: 2025-01-22

## 2025-01-22 RX ORDER — BUPROPION HYDROCHLORIDE 150 MG/1
150 TABLET ORAL EVERY MORNING
Qty: 90 TABLET | Refills: 3 | Status: SHIPPED | OUTPATIENT
Start: 2025-01-22

## 2025-01-22 NOTE — TELEPHONE ENCOUNTER
Recent Visits  Date Type Provider Dept   11/25/24 Office Visit Kareem Bruner APRN - CNP [facility]   07/11/24 Office Visit Kareem Bruner APRN - [facility]NP [facility]   Showing recent visits within past 540 days with a meds authorizing provider and meeting all other requirements  Future Appointments  No visits were found meeting these conditions.  Showing future appointments within next 150 days with a meds authorizing provider and meeting all other requirements

## 2025-01-29 DIAGNOSIS — Z82.41 FAMILY HISTORY OF SUDDEN CARDIAC DEATH (SCD): ICD-10-CM

## 2025-01-29 DIAGNOSIS — I10 PRIMARY HYPERTENSION: ICD-10-CM

## 2025-01-30 RX ORDER — ATORVASTATIN CALCIUM 10 MG/1
10 TABLET, FILM COATED ORAL DAILY
Qty: 90 TABLET | Refills: 4 | Status: SHIPPED | OUTPATIENT
Start: 2025-01-30

## 2025-02-23 DIAGNOSIS — F41.9 ANXIETY: ICD-10-CM

## 2025-02-23 DIAGNOSIS — G43.009 MIGRAINE WITHOUT AURA AND WITHOUT STATUS MIGRAINOSUS, NOT INTRACTABLE: ICD-10-CM

## 2025-02-24 RX ORDER — AMITRIPTYLINE HYDROCHLORIDE 100 MG/1
100 TABLET ORAL NIGHTLY
Qty: 90 TABLET | Refills: 4 | Status: SHIPPED | OUTPATIENT
Start: 2025-02-24

## 2025-02-24 RX ORDER — OMEPRAZOLE 20 MG/1
20 CAPSULE, DELAYED RELEASE ORAL DAILY
Qty: 90 CAPSULE | Refills: 4 | Status: SHIPPED | OUTPATIENT
Start: 2025-02-24

## 2025-02-24 NOTE — TELEPHONE ENCOUNTER
Recent Visits  Date Type Provider Dept   11/25/24 Office Visit Kareem Bruner APRN - CNP [facility]   07/11/24 Office Visit Kareem Bruner APRN - [facility]   Showing recent visits within past 540 days with a meds authorizing provider and meeting all other requirements  Future Appointments  Date Type Provider Dept   07/14/25 Appointment Kareem Bruner APRN - CNP [facility]   Showing future appointments within next 150 days with a meds authorizing provider and meeting all other requirements

## 2025-03-12 RX ORDER — OMEPRAZOLE 20 MG/1
20 CAPSULE, DELAYED RELEASE ORAL DAILY
Qty: 90 CAPSULE | Refills: 4 | Status: SHIPPED | OUTPATIENT
Start: 2025-03-12

## 2025-04-15 RX ORDER — METOPROLOL TARTRATE 25 MG/1
25 TABLET, FILM COATED ORAL 2 TIMES DAILY
Qty: 180 TABLET | Refills: 1 | Status: SHIPPED | OUTPATIENT
Start: 2025-04-15

## 2025-04-15 NOTE — TELEPHONE ENCOUNTER
Recent Visits  Date Type Provider Dept   11/25/24 Office Visit Kaerem Bruner APRN - CNP [facility]   07/11/24 Office Visit Kareem Bruner APRN - [facility]   Showing recent visits within past 540 days with a meds authorizing provider and meeting all other requirements  Future Appointments  Date Type Provider Dept   07/14/25 Appointment Kareem Bruner APRN - CNP [facility]   Showing future appointments within next 150 days with a meds authorizing provider and meeting all other requirements

## 2025-06-02 DIAGNOSIS — F41.9 ANXIETY: ICD-10-CM

## 2025-06-02 DIAGNOSIS — G43.009 MIGRAINE WITHOUT AURA AND WITHOUT STATUS MIGRAINOSUS, NOT INTRACTABLE: ICD-10-CM

## 2025-06-02 RX ORDER — AMITRIPTYLINE HYDROCHLORIDE 100 MG/1
100 TABLET ORAL NIGHTLY
Qty: 90 TABLET | Refills: 4 | Status: SHIPPED | OUTPATIENT
Start: 2025-06-02

## 2025-06-02 NOTE — TELEPHONE ENCOUNTER
Recent Visits  Date Type Provider Dept   11/25/24 Office Visit Kareem Bruner APRN - [facility]   07/11/24 Office Visit Kareem Brnuer APRN - [facility]   Showing recent visits within past 540 days with a meds authorizing provider and meeting all other requirements  Future Appointments  Date Type Provider Dept   07/14/25 Appointment Kareem Bruner APRN - CNP [facility]   Showing future appointments within next 150 days with a meds authorizing provider and meeting all other requirements

## 2025-06-19 DIAGNOSIS — I10 PRIMARY HYPERTENSION: ICD-10-CM

## 2025-06-19 DIAGNOSIS — Z82.41 FAMILY HISTORY OF SUDDEN CARDIAC DEATH (SCD): ICD-10-CM

## 2025-06-19 RX ORDER — ATORVASTATIN CALCIUM 10 MG/1
10 TABLET, FILM COATED ORAL DAILY
Qty: 90 TABLET | Refills: 4 | Status: SHIPPED | OUTPATIENT
Start: 2025-06-19

## 2025-07-14 ENCOUNTER — OFFICE VISIT (OUTPATIENT)
Dept: FAMILY MEDICINE CLINIC | Age: 40
End: 2025-07-14
Payer: COMMERCIAL

## 2025-07-14 VITALS
SYSTOLIC BLOOD PRESSURE: 126 MMHG | HEIGHT: 70 IN | WEIGHT: 176 LBS | BODY MASS INDEX: 25.2 KG/M2 | RESPIRATION RATE: 16 BRPM | HEART RATE: 76 BPM | DIASTOLIC BLOOD PRESSURE: 78 MMHG | TEMPERATURE: 98 F | OXYGEN SATURATION: 98 %

## 2025-07-14 DIAGNOSIS — R06.83 SNORES: ICD-10-CM

## 2025-07-14 DIAGNOSIS — G47.33 OBSTRUCTIVE SLEEP APNEA: ICD-10-CM

## 2025-07-14 DIAGNOSIS — I10 PRIMARY HYPERTENSION: ICD-10-CM

## 2025-07-14 DIAGNOSIS — R45.86 MOOD SWING: ICD-10-CM

## 2025-07-14 DIAGNOSIS — K21.00 GASTROESOPHAGEAL REFLUX DISEASE WITH ESOPHAGITIS WITHOUT HEMORRHAGE: ICD-10-CM

## 2025-07-14 DIAGNOSIS — Z00.00 ENCOUNTER FOR WELL ADULT EXAM WITHOUT ABNORMAL FINDINGS: Primary | ICD-10-CM

## 2025-07-14 DIAGNOSIS — F41.9 ANXIETY: ICD-10-CM

## 2025-07-14 DIAGNOSIS — Z82.41 FAMILY HISTORY OF SUDDEN CARDIAC DEATH (SCD): ICD-10-CM

## 2025-07-14 PROCEDURE — 99396 PREV VISIT EST AGE 40-64: CPT | Performed by: NURSE PRACTITIONER

## 2025-07-14 PROCEDURE — 3074F SYST BP LT 130 MM HG: CPT | Performed by: NURSE PRACTITIONER

## 2025-07-14 PROCEDURE — 3078F DIAST BP <80 MM HG: CPT | Performed by: NURSE PRACTITIONER

## 2025-07-14 RX ORDER — METOPROLOL TARTRATE 25 MG/1
25 TABLET, FILM COATED ORAL 2 TIMES DAILY
Qty: 180 TABLET | Refills: 4 | Status: SHIPPED | OUTPATIENT
Start: 2025-07-14

## 2025-07-14 SDOH — ECONOMIC STABILITY: FOOD INSECURITY: WITHIN THE PAST 12 MONTHS, YOU WORRIED THAT YOUR FOOD WOULD RUN OUT BEFORE YOU GOT MONEY TO BUY MORE.: NEVER TRUE

## 2025-07-14 SDOH — ECONOMIC STABILITY: FOOD INSECURITY: WITHIN THE PAST 12 MONTHS, THE FOOD YOU BOUGHT JUST DIDN'T LAST AND YOU DIDN'T HAVE MONEY TO GET MORE.: NEVER TRUE

## 2025-07-14 ASSESSMENT — PATIENT HEALTH QUESTIONNAIRE - PHQ9
5. POOR APPETITE OR OVEREATING: NOT AT ALL
SUM OF ALL RESPONSES TO PHQ QUESTIONS 1-9: 3
4. FEELING TIRED OR HAVING LITTLE ENERGY: NOT AT ALL
1. LITTLE INTEREST OR PLEASURE IN DOING THINGS: NOT AT ALL
7. TROUBLE CONCENTRATING ON THINGS, SUCH AS READING THE NEWSPAPER OR WATCHING TELEVISION: NOT AT ALL
8. MOVING OR SPEAKING SO SLOWLY THAT OTHER PEOPLE COULD HAVE NOTICED. OR THE OPPOSITE, BEING SO FIGETY OR RESTLESS THAT YOU HAVE BEEN MOVING AROUND A LOT MORE THAN USUAL: NEARLY EVERY DAY
9. THOUGHTS THAT YOU WOULD BE BETTER OFF DEAD, OR OF HURTING YOURSELF: NOT AT ALL
10. IF YOU CHECKED OFF ANY PROBLEMS, HOW DIFFICULT HAVE THESE PROBLEMS MADE IT FOR YOU TO DO YOUR WORK, TAKE CARE OF THINGS AT HOME, OR GET ALONG WITH OTHER PEOPLE: NOT DIFFICULT AT ALL
2. FEELING DOWN, DEPRESSED OR HOPELESS: NOT AT ALL
SUM OF ALL RESPONSES TO PHQ QUESTIONS 1-9: 3
3. TROUBLE FALLING OR STAYING ASLEEP: NOT AT ALL
6. FEELING BAD ABOUT YOURSELF - OR THAT YOU ARE A FAILURE OR HAVE LET YOURSELF OR YOUR FAMILY DOWN: NOT AT ALL

## 2025-07-14 ASSESSMENT — ENCOUNTER SYMPTOMS
PHOTOPHOBIA: 0
RESPIRATORY NEGATIVE: 1
GASTROINTESTINAL NEGATIVE: 1

## 2025-07-14 NOTE — PATIENT INSTRUCTIONS
hands, brush your teeth twice a day, and wear a seat belt in the car.   Where can you learn more?  Go to https://www.timeplazza.net/patientEd and enter P072 to learn more about \"Well Visit, Ages 18 to 65: Care Instructions.\"  Current as of: April 30, 2024  Content Version: 14.5  © 0201-7561 [employer].   Care instructions adapted under license by [employer]. If you have questions about a medical condition or this instruction, always ask your healthcare professional. [employer] disclaims any warranty or liability for your use of this information.

## 2025-07-14 NOTE — PROGRESS NOTES
Well Adult Note  Name: Nehemiah Waters Today’s Date: 2025   MRN: [id number] Sex: Male   Age: 40 y.o. Ethnicity: Non- / Non    : 1985 Race: White (non-)      Nehemiah Waters is here for a well adult exam.       Assessment & Plan   Encounter for well adult exam without abnormal findings  -     CBC with Auto Differential; Future  -     Comprehensive Metabolic Panel; Future  -     TSH reflex to FT4; Future  -     Lipid Panel; Future  Gastroesophageal reflux disease with esophagitis without hemorrhage  -     CBC with Auto Differential; Future  -     Comprehensive Metabolic Panel; Future  -     TSH reflex to FT4; Future  -     Lipid Panel; Future  At goal continue GERD diet and PPI  Anxiety  At goal continue wellbutrin, lamictal, elavil   Mood swing  Family history of sudden cardiac death (SCD)  Obstructive sleep apnea  Declines CPAP   Snores  Primary hypertension  At goal continue current meds  -     CBC with Auto Differential; Future  -     Comprehensive Metabolic Panel; Future  -     TSH reflex to FT4; Future  -     Lipid Panel; Future  -     metoprolol tartrate (LOPRESSOR) 25 MG tablet; Take 1 tablet by mouth 2 times daily, Disp-180 tablet, R-4Normal        Return in about 1 year (around 2026) for Physical Exam and med refill .       Subjective   History:      Pt here for physical exam     Denies trouble sleeping at hs  Does have sleep apnea but no using CPAP advised to f/u with pulmonary for this  , but states sleeping well without it   -works full time  -no formal exercise program     Drives a truck    Hyperlipidemia  Noted to have elevated cholesterol level  Father and brother with MI's   -started lipitor 10 mg daily     HTN  Controlled lopressor 25 mg bid  Family Hx of CAD brother and father with Mi's   Denies CP or SOB on activity     GERD  Controlled with prilosec 20 mg daily  Denies symptoms     Anxiety/Mood swing/MDD/H/A  States meds no longer working, not sure

## 2025-09-02 DIAGNOSIS — R45.86 MOOD SWING: ICD-10-CM

## 2025-09-02 DIAGNOSIS — F33.41 RECURRENT MAJOR DEPRESSIVE DISORDER, IN PARTIAL REMISSION: ICD-10-CM

## 2025-09-02 DIAGNOSIS — F41.9 ANXIETY: ICD-10-CM

## 2025-09-03 RX ORDER — LAMOTRIGINE 100 MG/1
TABLET ORAL
Qty: 180 TABLET | Refills: 4 | Status: SHIPPED | OUTPATIENT
Start: 2025-09-03

## 2025-09-03 RX ORDER — BUPROPION HYDROCHLORIDE 150 MG/1
150 TABLET ORAL EVERY MORNING
Qty: 90 TABLET | Refills: 3 | Status: SHIPPED | OUTPATIENT
Start: 2025-09-03

## 2025-09-03 RX ORDER — OMEPRAZOLE 20 MG/1
20 CAPSULE, DELAYED RELEASE ORAL DAILY
Qty: 90 CAPSULE | Refills: 4 | Status: SHIPPED | OUTPATIENT
Start: 2025-09-03